# Patient Record
Sex: MALE | Race: WHITE
[De-identification: names, ages, dates, MRNs, and addresses within clinical notes are randomized per-mention and may not be internally consistent; named-entity substitution may affect disease eponyms.]

---

## 2017-05-31 ENCOUNTER — HOSPITAL ENCOUNTER (OUTPATIENT)
Dept: HOSPITAL 53 - M LAB | Age: 77
End: 2017-05-31
Attending: UROLOGY
Payer: MEDICARE

## 2017-05-31 DIAGNOSIS — R39.12: Primary | ICD-10-CM

## 2018-03-21 ENCOUNTER — HOSPITAL ENCOUNTER (OUTPATIENT)
Dept: HOSPITAL 53 - M SMT | Age: 78
End: 2018-03-21
Attending: NURSE PRACTITIONER
Payer: MEDICARE

## 2018-03-21 DIAGNOSIS — I50.20: ICD-10-CM

## 2018-03-21 DIAGNOSIS — I25.10: Primary | ICD-10-CM

## 2018-03-21 LAB
ANION GAP: 8 MEQ/L (ref 8–16)
BLOOD UREA NITROGEN: 22 MG/DL (ref 7–18)
CALCIUM LEVEL: 9 MG/DL (ref 8.8–10.2)
CARBON DIOXIDE LEVEL: 27 MEQ/L (ref 21–32)
CHLORIDE LEVEL: 104 MEQ/L (ref 98–107)
CREATININE FOR GFR: 1.47 MG/DL (ref 0.7–1.3)
GFR SERPL CREATININE-BSD FRML MDRD: 49.6 ML/MIN/{1.73_M2} (ref 42–?)
GLUCOSE, FASTING: 98 MG/DL (ref 70–100)
NT-PRO BNP: 738 PG/ML (ref ?–450)
POTASSIUM SERUM: 4.6 MEQ/L (ref 3.5–5.1)
SODIUM LEVEL: 139 MEQ/L (ref 136–145)

## 2018-03-21 PROCEDURE — 80048 BASIC METABOLIC PNL TOTAL CA: CPT

## 2018-04-13 ENCOUNTER — HOSPITAL ENCOUNTER (INPATIENT)
Dept: HOSPITAL 53 - M MS5PR | Age: 78
LOS: 7 days | Discharge: HOME | DRG: 871 | End: 2018-04-20
Attending: INTERNAL MEDICINE | Admitting: HOSPITALIST
Payer: MEDICARE

## 2018-04-13 DIAGNOSIS — Z79.82: ICD-10-CM

## 2018-04-13 DIAGNOSIS — J96.01: ICD-10-CM

## 2018-04-13 DIAGNOSIS — N18.3: ICD-10-CM

## 2018-04-13 DIAGNOSIS — Z79.01: ICD-10-CM

## 2018-04-13 DIAGNOSIS — D64.9: ICD-10-CM

## 2018-04-13 DIAGNOSIS — I50.31: ICD-10-CM

## 2018-04-13 DIAGNOSIS — K64.8: ICD-10-CM

## 2018-04-13 DIAGNOSIS — D69.6: ICD-10-CM

## 2018-04-13 DIAGNOSIS — K57.30: ICD-10-CM

## 2018-04-13 DIAGNOSIS — I25.10: ICD-10-CM

## 2018-04-13 DIAGNOSIS — E87.2: ICD-10-CM

## 2018-04-13 DIAGNOSIS — E78.5: ICD-10-CM

## 2018-04-13 DIAGNOSIS — Z88.0: ICD-10-CM

## 2018-04-13 DIAGNOSIS — I48.91: ICD-10-CM

## 2018-04-13 DIAGNOSIS — I13.0: ICD-10-CM

## 2018-04-13 DIAGNOSIS — A41.9: Primary | ICD-10-CM

## 2018-04-13 DIAGNOSIS — E11.9: ICD-10-CM

## 2018-04-13 DIAGNOSIS — Z88.2: ICD-10-CM

## 2018-04-13 DIAGNOSIS — Z95.0: ICD-10-CM

## 2018-04-13 DIAGNOSIS — J18.9: ICD-10-CM

## 2018-04-13 DIAGNOSIS — Z79.899: ICD-10-CM

## 2018-04-13 LAB
ABG BASE EXCESS: -2.5 (ref -2–2)
ABG HCO3: 19.2 MEQ/L (ref 22–26)
ABG O2 SATURATION: 94.7 % (ref 95–99)
ABG PARTIAL PRESSURE CO2: 24.6 MMHG (ref 35–45)
ABG PARTIAL PRESSURE O2: 74.8 MMHG (ref 75–100)
ABG PH (ARTERIAL): 7.51 UNITS (ref 7.35–7.45)
ABG STANDARD HCO3: 22.4 MEQ/L (ref 22–26)
ABG TOTAL CO2: 20 MEQ/L (ref 23–31)
ALBUMIN/GLOBULIN RATIO: 0.65 (ref 1–1.93)
ALBUMIN/GLOBULIN RATIO: 0.79 (ref 1–1.93)
ALBUMIN: 2.6 GM/DL (ref 3.2–5.2)
ALBUMIN: 3 GM/DL (ref 3.2–5.2)
ALKALINE PHOSPHATASE: 46 U/L (ref 45–117)
ALKALINE PHOSPHATASE: 56 U/L (ref 45–117)
ALT SERPL W P-5'-P-CCNC: 10 U/L (ref 12–78)
ALT SERPL W P-5'-P-CCNC: 12 U/L (ref 12–78)
AMYLASE SERPL-CCNC: 43 U/L (ref 25–115)
ANION GAP: 9 MEQ/L (ref 8–16)
ANION GAP: 9 MEQ/L (ref 8–16)
AST SERPL-CCNC: 11 U/L (ref 7–37)
AST SERPL-CCNC: 13 U/L (ref 7–37)
BASO #: 0 10^3/UL (ref 0–0.2)
BASO %: 0.3 % (ref 0–1)
BILIRUB CONJ SERPL-MCNC: 0.4 MG/DL (ref 0–0.2)
BILIRUB CONJ SERPL-MCNC: 0.5 MG/DL (ref 0–0.2)
BILIRUBIN,TOTAL: 0.8 MG/DL (ref 0.2–1)
BILIRUBIN,TOTAL: 1.1 MG/DL (ref 0.2–1)
BLOOD UREA NITROGEN: 30 MG/DL (ref 7–18)
BLOOD UREA NITROGEN: 31 MG/DL (ref 7–18)
CALCIUM LEVEL: 7.7 MG/DL (ref 8.8–10.2)
CALCIUM LEVEL: 7.9 MG/DL (ref 8.8–10.2)
CARBON DIOXIDE LEVEL: 21 MEQ/L (ref 21–32)
CARBON DIOXIDE LEVEL: 22 MEQ/L (ref 21–32)
CHLORIDE LEVEL: 105 MEQ/L (ref 98–107)
CHLORIDE LEVEL: 107 MEQ/L (ref 98–107)
CK MB CFR.DF SERPL CALC: 1.28
CK SERPL-CCNC: 78 U/L (ref 39–308)
CK-MB VALUE MASS: < 1 NG/ML (ref ?–3.6)
CREATININE FOR GFR: 1.7 MG/DL (ref 0.7–1.3)
CREATININE FOR GFR: 1.94 MG/DL (ref 0.7–1.3)
CRP SERPL-MCNC: 16.3 MG/DL (ref 0–0.3)
EOS #: 0 10^3/UL (ref 0–0.5)
EOSINOPHIL NFR BLD AUTO: 0 % (ref 0–3)
FERRITIN: 54 NG/ML (ref 26–388)
GFR SERPL CREATININE-BSD FRML MDRD: 36 ML/MIN/{1.73_M2} (ref 42–?)
GFR SERPL CREATININE-BSD FRML MDRD: 41.9 ML/MIN/{1.73_M2} (ref 42–?)
GLUCOSE BLDC GLUCOMTR-MCNC: 254 MG/DL (ref 83–110)
GLUCOSE BLDC GLUCOMTR-MCNC: 269 MG/DL (ref 83–110)
GLUCOSE BLDC GLUCOMTR-MCNC: 291 MG/DL (ref 83–110)
GLUCOSE, FASTING: 167 MG/DL (ref 70–100)
GLUCOSE, FASTING: 197 MG/DL (ref 70–100)
HEMATOCRIT: 30.5 % (ref 42–52)
HEMATOCRIT: 32.3 % (ref 42–52)
HEMOGLOBIN: 10.4 G/DL (ref 13.5–17.5)
HEMOGLOBIN: 9.5 G/DL (ref 13.5–17.5)
IMMATURE GRANULOCYTE %: 0.6 % (ref 0–3)
INR: 1.98
INR: 2.11
IRON (FE): 26 UG/DL (ref 65–175)
IRON SATN MFR SERPL: 7.8 % (ref 19.7–50)
LACTIC ACID SEPSIS PROTOCOL: 2.2 MMOL/L (ref 0.4–2)
LYMPH #: 0.8 10^3/UL (ref 1.5–4.5)
LYMPH %: 5.8 % (ref 24–44)
MEAN CORPUSCULAR HEMOGLOBIN: 24.9 PG (ref 27–33)
MEAN CORPUSCULAR HEMOGLOBIN: 25.8 PG (ref 27–33)
MEAN CORPUSCULAR HGB CONC: 31.1 G/DL (ref 32–36.5)
MEAN CORPUSCULAR HGB CONC: 32.2 G/DL (ref 32–36.5)
MEAN CORPUSCULAR VOLUME: 80.1 FL (ref 80–96)
MEAN CORPUSCULAR VOLUME: 80.1 FL (ref 80–96)
MONO #: 1.1 10^3/UL (ref 0–0.8)
MONO %: 7.7 % (ref 0–5)
NEUTROPHILS #: 12.3 10^3/UL (ref 1.8–7.7)
NEUTROPHILS %: 85.6 % (ref 36–66)
NRBC BLD AUTO-RTO: 0 % (ref 0–0)
NRBC BLD AUTO-RTO: 0 % (ref 0–0)
PARTIAL THROMBOPLASTIN TIME: 56.9 SECONDS (ref 26.8–37.9)
PARTIAL THROMBOPLASTIN TIME: 58.7 SECONDS (ref 26.8–37.9)
PLATELET COUNT, AUTOMATED: 124 10^3/UL (ref 150–450)
PLATELET COUNT, AUTOMATED: 135 10^3/UL (ref 150–450)
POTASSIUM SERUM: 4.4 MEQ/L (ref 3.5–5.1)
POTASSIUM SERUM: 4.4 MEQ/L (ref 3.5–5.1)
PROTHROMBIN TIME: 23.2 SECONDS (ref 12.4–14.5)
PROTHROMBIN TIME: 24.4 SECONDS (ref 12.4–14.5)
RED BLOOD COUNT: 3.81 10^6/UL (ref 4.3–6.1)
RED BLOOD COUNT: 4.03 10^6/UL (ref 4.3–6.1)
RED CELL DISTRIBUTION WIDTH: 15.5 % (ref 11.5–14.5)
RED CELL DISTRIBUTION WIDTH: 15.5 % (ref 11.5–14.5)
RETIC HEMOGLOBIN EQUIVALENT: 27.5 PG (ref 24–36)
RETICS/RBC NFR: 1.1 % (ref 0.5–1.5)
RETICULOCYTE #: 45.5 10^9/L (ref 17–77)
SODIUM LEVEL: 136 MEQ/L (ref 136–145)
SODIUM LEVEL: 137 MEQ/L (ref 136–145)
TOTAL IRON BINDING CAPACITY: 332 UG/DL (ref 250–450)
TOTAL PROTEIN: 6.6 GM/DL (ref 6.4–8.2)
TOTAL PROTEIN: 6.8 GM/DL (ref 6.4–8.2)
TROPONIN I: 0.06 NG/ML (ref ?–0.1)
WHITE BLOOD COUNT: 12.5 10^3/UL (ref 4–10)
WHITE BLOOD COUNT: 14.4 10^3/UL (ref 4–10)

## 2018-04-13 RX ADMIN — SODIUM CHLORIDE 1 MLS/HR: 9 INJECTION, SOLUTION INTRAVENOUS at 16:09

## 2018-04-13 RX ADMIN — FERROUS SULFATE TAB 325 MG (65 MG ELEMENTAL FE) 1 MG: 325 (65 FE) TAB at 20:26

## 2018-04-13 RX ADMIN — INSULIN LISPRO 2 UNITS: 100 INJECTION, SOLUTION INTRAVENOUS; SUBCUTANEOUS at 20:26

## 2018-04-13 RX ADMIN — CEFTRIAXONE 1 MLS/HR: 2 INJECTION, POWDER, FOR SOLUTION INTRAMUSCULAR; INTRAVENOUS at 02:25

## 2018-04-13 RX ADMIN — WARFARIN SODIUM 1 MG: 2.5 TABLET ORAL at 16:09

## 2018-04-13 RX ADMIN — LEVOTHYROXINE SODIUM 1 MCG: 137 TABLET ORAL at 08:26

## 2018-04-13 RX ADMIN — SODIUM CHLORIDE 1 ML: 9 INJECTION, SOLUTION INTRAVENOUS at 06:00

## 2018-04-13 RX ADMIN — INSULIN LISPRO 4 UNITS: 100 INJECTION, SOLUTION INTRAVENOUS; SUBCUTANEOUS at 08:27

## 2018-04-13 RX ADMIN — SODIUM CHLORIDE 1 UNITS: 4.5 INJECTION, SOLUTION INTRAVENOUS at 04:26

## 2018-04-13 RX ADMIN — METHYLPREDNISOLONE SODIUM SUCCINATE 1 MG: 125 INJECTION, POWDER, FOR SOLUTION INTRAMUSCULAR; INTRAVENOUS at 03:54

## 2018-04-13 RX ADMIN — FUROSEMIDE 1 MG: 20 TABLET ORAL at 08:27

## 2018-04-13 RX ADMIN — SODIUM CHLORIDE 1 MLS/HR: 9 INJECTION, SOLUTION INTRAVENOUS at 08:28

## 2018-04-13 RX ADMIN — DOCUSATE SODIUM 1 MG: 100 CAPSULE, LIQUID FILLED ORAL at 08:26

## 2018-04-13 RX ADMIN — GABAPENTIN 1 MG: 300 CAPSULE ORAL at 20:26

## 2018-04-13 RX ADMIN — SODIUM CHLORIDE 1 MLS/HR: 9 INJECTION, SOLUTION INTRAVENOUS at 04:24

## 2018-04-13 RX ADMIN — DEXTROSE MONOHYDRATE 1 MLS/HR: 50 INJECTION, SOLUTION INTRAVENOUS at 04:25

## 2018-04-13 RX ADMIN — SODIUM CHLORIDE 1 MLS/HR: 9 INJECTION, SOLUTION INTRAVENOUS at 02:45

## 2018-04-13 RX ADMIN — INSULIN LISPRO 8 UNITS: 100 INJECTION, SOLUTION INTRAVENOUS; SUBCUTANEOUS at 12:27

## 2018-04-13 RX ADMIN — IPRATROPIUM BROMIDE AND ALBUTEROL SULFATE 1 ML: .5; 3 SOLUTION RESPIRATORY (INHALATION) at 17:02

## 2018-04-13 RX ADMIN — SODIUM CHLORIDE 1 ML: 9 INJECTION, SOLUTION INTRAVENOUS at 04:15

## 2018-04-13 RX ADMIN — INSULIN LISPRO 8 UNITS: 100 INJECTION, SOLUTION INTRAVENOUS; SUBCUTANEOUS at 17:21

## 2018-04-13 RX ADMIN — ASPIRIN 1 MG: 81 TABLET ORAL at 15:41

## 2018-04-13 RX ADMIN — ACETAMINOPHEN 1 MG: 325 TABLET ORAL at 02:15

## 2018-04-13 RX ADMIN — SODIUM CHLORIDE 1 MLS/HR: 9 INJECTION, SOLUTION INTRAVENOUS at 03:12

## 2018-04-13 RX ADMIN — GABAPENTIN 1 MG: 300 CAPSULE ORAL at 08:27

## 2018-04-13 RX ADMIN — IPRATROPIUM BROMIDE AND ALBUTEROL SULFATE 1 ML: .5; 3 SOLUTION RESPIRATORY (INHALATION) at 09:03

## 2018-04-13 RX ADMIN — SODIUM CHLORIDE 1 MLS/HR: 9 INJECTION, SOLUTION INTRAVENOUS at 22:51

## 2018-04-13 RX ADMIN — DOCUSATE SODIUM 1 MG: 100 CAPSULE, LIQUID FILLED ORAL at 20:26

## 2018-04-13 RX ADMIN — DIGOXIN 1 MG: 125 TABLET ORAL at 08:27

## 2018-04-13 RX ADMIN — IPRATROPIUM BROMIDE AND ALBUTEROL SULFATE 1 ML: .5; 3 SOLUTION RESPIRATORY (INHALATION) at 23:32

## 2018-04-14 LAB
ANION GAP: 9 MEQ/L (ref 8–16)
BLOOD UREA NITROGEN: 37 MG/DL (ref 7–18)
CALCIUM LEVEL: 7.7 MG/DL (ref 8.8–10.2)
CARBON DIOXIDE LEVEL: 20 MEQ/L (ref 21–32)
CHLORIDE LEVEL: 112 MEQ/L (ref 98–107)
CREATININE FOR GFR: 1.4 MG/DL (ref 0.7–1.3)
GFR SERPL CREATININE-BSD FRML MDRD: 52.5 ML/MIN/{1.73_M2} (ref 42–?)
GLUCOSE BLDC GLUCOMTR-MCNC: 227 MG/DL (ref 83–110)
GLUCOSE BLDC GLUCOMTR-MCNC: 301 MG/DL (ref 83–110)
GLUCOSE BLDC GLUCOMTR-MCNC: 350 MG/DL (ref 83–110)
GLUCOSE, FASTING: 200 MG/DL (ref 70–100)
HEMATOCRIT: 31.8 % (ref 42–52)
HEMOGLOBIN: 10 G/DL (ref 13.5–17.5)
INR: 2.43
MEAN CORPUSCULAR HEMOGLOBIN: 25.4 PG (ref 27–33)
MEAN CORPUSCULAR HGB CONC: 31.4 G/DL (ref 32–36.5)
MEAN CORPUSCULAR VOLUME: 80.7 FL (ref 80–96)
NRBC BLD AUTO-RTO: 0 % (ref 0–0)
PLATELET COUNT, AUTOMATED: 128 10^3/UL (ref 150–450)
POTASSIUM SERUM: 4.4 MEQ/L (ref 3.5–5.1)
PROTHROMBIN TIME: 27.4 SECONDS (ref 12.4–14.5)
RED BLOOD COUNT: 3.94 10^6/UL (ref 4.3–6.1)
RED CELL DISTRIBUTION WIDTH: 15.2 % (ref 11.5–14.5)
SODIUM LEVEL: 141 MEQ/L (ref 136–145)
TRANSFERRIN SERPL-MCNC: 222 MG/DL (ref 200–370)
WHITE BLOOD COUNT: 10.7 10^3/UL (ref 4–10)

## 2018-04-14 RX ADMIN — INSULIN LISPRO 1 UNITS: 100 INJECTION, SOLUTION INTRAVENOUS; SUBCUTANEOUS at 21:00

## 2018-04-14 RX ADMIN — WARFARIN SODIUM 1 MG: 2.5 TABLET ORAL at 17:38

## 2018-04-14 RX ADMIN — SODIUM CHLORIDE 1 MLS/HR: 9 INJECTION, SOLUTION INTRAVENOUS at 05:04

## 2018-04-14 RX ADMIN — GABAPENTIN 1 MG: 300 CAPSULE ORAL at 21:58

## 2018-04-14 RX ADMIN — DOCUSATE SODIUM 1 MG: 100 CAPSULE, LIQUID FILLED ORAL at 08:44

## 2018-04-14 RX ADMIN — GABAPENTIN 1 MG: 300 CAPSULE ORAL at 08:43

## 2018-04-14 RX ADMIN — DEXTROSE MONOHYDRATE 1 MLS/HR: 50 INJECTION, SOLUTION INTRAVENOUS at 05:04

## 2018-04-14 RX ADMIN — IPRATROPIUM BROMIDE AND ALBUTEROL SULFATE 1 ML: .5; 3 SOLUTION RESPIRATORY (INHALATION) at 07:32

## 2018-04-14 RX ADMIN — ASPIRIN 1 MG: 81 TABLET ORAL at 08:44

## 2018-04-14 RX ADMIN — FERROUS SULFATE TAB 325 MG (65 MG ELEMENTAL FE) 1 MG: 325 (65 FE) TAB at 08:44

## 2018-04-14 RX ADMIN — INSULIN LISPRO 4 UNITS: 100 INJECTION, SOLUTION INTRAVENOUS; SUBCUTANEOUS at 07:41

## 2018-04-14 RX ADMIN — CEFTRIAXONE 1 MLS/HR: 1 INJECTION, POWDER, FOR SOLUTION INTRAMUSCULAR; INTRAVENOUS at 08:45

## 2018-04-14 RX ADMIN — DOCUSATE SODIUM 1 MG: 100 CAPSULE, LIQUID FILLED ORAL at 21:58

## 2018-04-14 RX ADMIN — FERROUS SULFATE TAB 325 MG (65 MG ELEMENTAL FE) 1 MG: 325 (65 FE) TAB at 21:59

## 2018-04-14 RX ADMIN — LEVOTHYROXINE SODIUM 1 MCG: 137 TABLET ORAL at 05:04

## 2018-04-14 RX ADMIN — IPRATROPIUM BROMIDE AND ALBUTEROL SULFATE 1 ML: .5; 3 SOLUTION RESPIRATORY (INHALATION) at 15:25

## 2018-04-14 RX ADMIN — INSULIN LISPRO 10 UNITS: 100 INJECTION, SOLUTION INTRAVENOUS; SUBCUTANEOUS at 12:50

## 2018-04-14 RX ADMIN — INSULIN LISPRO 10 UNITS: 100 INJECTION, SOLUTION INTRAVENOUS; SUBCUTANEOUS at 17:38

## 2018-04-14 RX ADMIN — IPRATROPIUM BROMIDE AND ALBUTEROL SULFATE 1 ML: .5; 3 SOLUTION RESPIRATORY (INHALATION) at 20:20

## 2018-04-14 RX ADMIN — DIGOXIN 1 MG: 125 TABLET ORAL at 08:44

## 2018-04-15 LAB
ANION GAP: 8 MEQ/L (ref 8–16)
BLOOD UREA NITROGEN: 34 MG/DL (ref 7–18)
CALCIUM LEVEL: 8.4 MG/DL (ref 8.8–10.2)
CARBON DIOXIDE LEVEL: 21 MEQ/L (ref 21–32)
CHLORIDE LEVEL: 109 MEQ/L (ref 98–107)
CREATININE FOR GFR: 1.25 MG/DL (ref 0.7–1.3)
GFR SERPL CREATININE-BSD FRML MDRD: 59.8 ML/MIN/{1.73_M2} (ref 42–?)
GLUCOSE BLDC GLUCOMTR-MCNC: 173 MG/DL (ref 83–110)
GLUCOSE BLDC GLUCOMTR-MCNC: 176 MG/DL (ref 83–110)
GLUCOSE BLDC GLUCOMTR-MCNC: 198 MG/DL (ref 83–110)
GLUCOSE, FASTING: 201 MG/DL (ref 70–100)
HEMATOCRIT: 33.9 % (ref 42–52)
HEMOGLOBIN: 10.6 G/DL (ref 13.5–17.5)
INR: 3.06
MEAN CORPUSCULAR HEMOGLOBIN: 25.3 PG (ref 27–33)
MEAN CORPUSCULAR HGB CONC: 31.3 G/DL (ref 32–36.5)
MEAN CORPUSCULAR VOLUME: 80.9 FL (ref 80–96)
NRBC BLD AUTO-RTO: 0 % (ref 0–0)
PLATELET COUNT, AUTOMATED: 162 10^3/UL (ref 150–450)
POTASSIUM SERUM: 4.8 MEQ/L (ref 3.5–5.1)
PROTHROMBIN TIME: 33 SECONDS (ref 12.4–14.5)
RED BLOOD COUNT: 4.19 10^6/UL (ref 4.3–6.1)
RED CELL DISTRIBUTION WIDTH: 15.5 % (ref 11.5–14.5)
SODIUM LEVEL: 138 MEQ/L (ref 136–145)
WHITE BLOOD COUNT: 9.8 10^3/UL (ref 4–10)

## 2018-04-15 RX ADMIN — IPRATROPIUM BROMIDE AND ALBUTEROL SULFATE 1 ML: .5; 3 SOLUTION RESPIRATORY (INHALATION) at 07:37

## 2018-04-15 RX ADMIN — DEXTROSE MONOHYDRATE 1 MLS/HR: 50 INJECTION, SOLUTION INTRAVENOUS at 04:07

## 2018-04-15 RX ADMIN — DOCUSATE SODIUM 1 MG: 100 CAPSULE, LIQUID FILLED ORAL at 20:02

## 2018-04-15 RX ADMIN — DOCUSATE SODIUM 1 MG: 100 CAPSULE, LIQUID FILLED ORAL at 08:19

## 2018-04-15 RX ADMIN — GABAPENTIN 1 MG: 300 CAPSULE ORAL at 08:19

## 2018-04-15 RX ADMIN — CEFTRIAXONE 1 MLS/HR: 1 INJECTION, POWDER, FOR SOLUTION INTRAMUSCULAR; INTRAVENOUS at 09:31

## 2018-04-15 RX ADMIN — IPRATROPIUM BROMIDE AND ALBUTEROL SULFATE 1 ML: .5; 3 SOLUTION RESPIRATORY (INHALATION) at 00:00

## 2018-04-15 RX ADMIN — FERROUS SULFATE TAB 325 MG (65 MG ELEMENTAL FE) 1 MG: 325 (65 FE) TAB at 08:19

## 2018-04-15 RX ADMIN — INSULIN LISPRO 4 UNITS: 100 INJECTION, SOLUTION INTRAVENOUS; SUBCUTANEOUS at 12:32

## 2018-04-15 RX ADMIN — IPRATROPIUM BROMIDE AND ALBUTEROL SULFATE 1 ML: .5; 3 SOLUTION RESPIRATORY (INHALATION) at 15:59

## 2018-04-15 RX ADMIN — INSULIN LISPRO 6 UNITS: 100 INJECTION, SOLUTION INTRAVENOUS; SUBCUTANEOUS at 08:18

## 2018-04-15 RX ADMIN — INSULIN LISPRO 1 UNITS: 100 INJECTION, SOLUTION INTRAVENOUS; SUBCUTANEOUS at 20:03

## 2018-04-15 RX ADMIN — GABAPENTIN 1 MG: 300 CAPSULE ORAL at 20:02

## 2018-04-15 RX ADMIN — LEVOTHYROXINE SODIUM 1 MCG: 137 TABLET ORAL at 05:17

## 2018-04-15 RX ADMIN — FERROUS SULFATE TAB 325 MG (65 MG ELEMENTAL FE) 1 MG: 325 (65 FE) TAB at 20:02

## 2018-04-15 RX ADMIN — INSULIN LISPRO 4 UNITS: 100 INJECTION, SOLUTION INTRAVENOUS; SUBCUTANEOUS at 17:37

## 2018-04-15 RX ADMIN — ASPIRIN 1 MG: 81 TABLET ORAL at 08:19

## 2018-04-15 RX ADMIN — DIGOXIN 1 MG: 125 TABLET ORAL at 08:22

## 2018-04-16 LAB
ANION GAP: 9 MEQ/L (ref 8–16)
BLOOD UREA NITROGEN: 31 MG/DL (ref 7–18)
CALCIUM LEVEL: 8.6 MG/DL (ref 8.8–10.2)
CARBON DIOXIDE LEVEL: 20 MEQ/L (ref 21–32)
CHLORIDE LEVEL: 110 MEQ/L (ref 98–107)
CREATININE FOR GFR: 1.28 MG/DL (ref 0.7–1.3)
CRP SERPL-MCNC: 6.34 MG/DL (ref 0–0.3)
GFR SERPL CREATININE-BSD FRML MDRD: 58.2 ML/MIN/{1.73_M2} (ref 42–?)
GLUCOSE BLDC GLUCOMTR-MCNC: 157 MG/DL (ref 83–110)
GLUCOSE BLDC GLUCOMTR-MCNC: 171 MG/DL (ref 83–110)
GLUCOSE BLDC GLUCOMTR-MCNC: 172 MG/DL (ref 83–110)
GLUCOSE, FASTING: 167 MG/DL (ref 70–100)
HEMATOCRIT: 36.1 % (ref 42–52)
HEMOGLOBIN: 11 G/DL (ref 13.5–17.5)
INR: 2.93
MEAN CORPUSCULAR HEMOGLOBIN: 24.6 PG (ref 27–33)
MEAN CORPUSCULAR HGB CONC: 30.5 G/DL (ref 32–36.5)
MEAN CORPUSCULAR VOLUME: 80.8 FL (ref 80–96)
NRBC BLD AUTO-RTO: 0.8 % (ref 0–0)
PLATELET COUNT, AUTOMATED: 174 10^3/UL (ref 150–450)
POTASSIUM SERUM: 4.5 MEQ/L (ref 3.5–5.1)
PROTHROMBIN TIME: 31.9 SECONDS (ref 12.4–14.5)
RED BLOOD COUNT: 4.47 10^6/UL (ref 4.3–6.1)
RED CELL DISTRIBUTION WIDTH: 15.9 % (ref 11.5–14.5)
SODIUM LEVEL: 139 MEQ/L (ref 136–145)
WHITE BLOOD COUNT: 9.9 10^3/UL (ref 4–10)

## 2018-04-16 RX ADMIN — AZITHROMYCIN MONOHYDRATE 1 MG: 250 TABLET ORAL at 09:34

## 2018-04-16 RX ADMIN — IPRATROPIUM BROMIDE AND ALBUTEROL SULFATE 1 ML: .5; 3 SOLUTION RESPIRATORY (INHALATION) at 07:17

## 2018-04-16 RX ADMIN — INSULIN LISPRO 4 UNITS: 100 INJECTION, SOLUTION INTRAVENOUS; SUBCUTANEOUS at 09:34

## 2018-04-16 RX ADMIN — DOCUSATE SODIUM 1 MG: 100 CAPSULE, LIQUID FILLED ORAL at 09:34

## 2018-04-16 RX ADMIN — GABAPENTIN 1 MG: 300 CAPSULE ORAL at 20:38

## 2018-04-16 RX ADMIN — DIGOXIN 1 MG: 125 TABLET ORAL at 09:37

## 2018-04-16 RX ADMIN — CEFTRIAXONE 1 MLS/HR: 1 INJECTION, POWDER, FOR SOLUTION INTRAMUSCULAR; INTRAVENOUS at 09:37

## 2018-04-16 RX ADMIN — INSULIN LISPRO 4 UNITS: 100 INJECTION, SOLUTION INTRAVENOUS; SUBCUTANEOUS at 13:25

## 2018-04-16 RX ADMIN — INSULIN LISPRO 1 UNITS: 100 INJECTION, SOLUTION INTRAVENOUS; SUBCUTANEOUS at 21:00

## 2018-04-16 RX ADMIN — CEFDINIR 1 MG: 300 CAPSULE ORAL at 20:37

## 2018-04-16 RX ADMIN — ASPIRIN 1 MG: 81 TABLET ORAL at 09:34

## 2018-04-16 RX ADMIN — DOCUSATE SODIUM 1 MG: 100 CAPSULE, LIQUID FILLED ORAL at 20:37

## 2018-04-16 RX ADMIN — IPRATROPIUM BROMIDE AND ALBUTEROL SULFATE 1 ML: .5; 3 SOLUTION RESPIRATORY (INHALATION) at 15:00

## 2018-04-16 RX ADMIN — WARFARIN SODIUM 1 MG: 2 TABLET ORAL at 17:37

## 2018-04-16 RX ADMIN — LEVOTHYROXINE SODIUM 1 MCG: 137 TABLET ORAL at 05:43

## 2018-04-16 RX ADMIN — GABAPENTIN 1 MG: 300 CAPSULE ORAL at 09:34

## 2018-04-16 RX ADMIN — FERROUS SULFATE TAB 325 MG (65 MG ELEMENTAL FE) 1 MG: 325 (65 FE) TAB at 20:37

## 2018-04-16 RX ADMIN — IPRATROPIUM BROMIDE AND ALBUTEROL SULFATE 1 ML: .5; 3 SOLUTION RESPIRATORY (INHALATION) at 23:25

## 2018-04-16 RX ADMIN — INSULIN LISPRO 4 UNITS: 100 INJECTION, SOLUTION INTRAVENOUS; SUBCUTANEOUS at 17:37

## 2018-04-16 RX ADMIN — FUROSEMIDE 1 MG: 20 TABLET ORAL at 09:35

## 2018-04-16 RX ADMIN — IPRATROPIUM BROMIDE AND ALBUTEROL SULFATE 1 ML: .5; 3 SOLUTION RESPIRATORY (INHALATION) at 00:00

## 2018-04-16 RX ADMIN — FERROUS SULFATE TAB 325 MG (65 MG ELEMENTAL FE) 1 MG: 325 (65 FE) TAB at 09:34

## 2018-04-17 LAB
ANION GAP: 7 MEQ/L (ref 8–16)
BLOOD UREA NITROGEN: 23 MG/DL (ref 7–18)
CALCIUM LEVEL: 8.4 MG/DL (ref 8.8–10.2)
CARBON DIOXIDE LEVEL: 25 MEQ/L (ref 21–32)
CHLORIDE LEVEL: 106 MEQ/L (ref 98–107)
CREATININE FOR GFR: 1.21 MG/DL (ref 0.7–1.3)
CRP SERPL-MCNC: 9.11 MG/DL (ref 0–0.3)
GFR SERPL CREATININE-BSD FRML MDRD: > 60 ML/MIN/{1.73_M2} (ref 42–?)
GLUCOSE BLDC GLUCOMTR-MCNC: 153 MG/DL (ref 83–110)
GLUCOSE BLDC GLUCOMTR-MCNC: 176 MG/DL (ref 83–110)
GLUCOSE BLDC GLUCOMTR-MCNC: 180 MG/DL (ref 83–110)
GLUCOSE, FASTING: 137 MG/DL (ref 70–100)
HEMATOCRIT: 34.1 % (ref 42–52)
HEMOGLOBIN: 10.6 G/DL (ref 13.5–17.5)
INR: 2.59
MEAN CORPUSCULAR HEMOGLOBIN: 25.7 PG (ref 27–33)
MEAN CORPUSCULAR HGB CONC: 31.1 G/DL (ref 32–36.5)
MEAN CORPUSCULAR VOLUME: 82.6 FL (ref 80–96)
NRBC BLD AUTO-RTO: 0.5 % (ref 0–0)
PLATELET COUNT, AUTOMATED: 156 10^3/UL (ref 150–450)
POTASSIUM SERUM: 4.2 MEQ/L (ref 3.5–5.1)
PROTHROMBIN TIME: 28.8 SECONDS (ref 12.4–14.5)
RED BLOOD COUNT: 4.13 10^6/UL (ref 4.3–6.1)
RED CELL DISTRIBUTION WIDTH: 16 % (ref 11.5–14.5)
SODIUM LEVEL: 138 MEQ/L (ref 136–145)
WHITE BLOOD COUNT: 9.5 10^3/UL (ref 4–10)

## 2018-04-17 RX ADMIN — FUROSEMIDE 1 MG: 10 INJECTION, SOLUTION INTRAMUSCULAR; INTRAVENOUS at 11:49

## 2018-04-17 RX ADMIN — ASPIRIN 1 MG: 81 TABLET ORAL at 08:12

## 2018-04-17 RX ADMIN — DOCUSATE SODIUM 1 MG: 100 CAPSULE, LIQUID FILLED ORAL at 20:25

## 2018-04-17 RX ADMIN — GABAPENTIN 1 MG: 300 CAPSULE ORAL at 20:24

## 2018-04-17 RX ADMIN — FUROSEMIDE 1 MG: 10 INJECTION, SOLUTION INTRAMUSCULAR; INTRAVENOUS at 17:45

## 2018-04-17 RX ADMIN — INSULIN LISPRO 4 UNITS: 100 INJECTION, SOLUTION INTRAVENOUS; SUBCUTANEOUS at 11:49

## 2018-04-17 RX ADMIN — GABAPENTIN 1 MG: 300 CAPSULE ORAL at 08:12

## 2018-04-17 RX ADMIN — AZITHROMYCIN MONOHYDRATE 1 MG: 250 TABLET ORAL at 08:12

## 2018-04-17 RX ADMIN — DIGOXIN 1 MG: 125 TABLET ORAL at 08:12

## 2018-04-17 RX ADMIN — IPRATROPIUM BROMIDE AND ALBUTEROL SULFATE 1 ML: .5; 3 SOLUTION RESPIRATORY (INHALATION) at 07:29

## 2018-04-17 RX ADMIN — DOCUSATE SODIUM 1 MG: 100 CAPSULE, LIQUID FILLED ORAL at 08:12

## 2018-04-17 RX ADMIN — CEFDINIR 1 MG: 300 CAPSULE ORAL at 08:11

## 2018-04-17 RX ADMIN — INSULIN LISPRO 4 UNITS: 100 INJECTION, SOLUTION INTRAVENOUS; SUBCUTANEOUS at 17:47

## 2018-04-17 RX ADMIN — IPRATROPIUM BROMIDE AND ALBUTEROL SULFATE 1 ML: .5; 3 SOLUTION RESPIRATORY (INHALATION) at 15:26

## 2018-04-17 RX ADMIN — INSULIN LISPRO 2 UNITS: 100 INJECTION, SOLUTION INTRAVENOUS; SUBCUTANEOUS at 08:11

## 2018-04-17 RX ADMIN — INSULIN LISPRO 1 UNITS: 100 INJECTION, SOLUTION INTRAVENOUS; SUBCUTANEOUS at 21:00

## 2018-04-17 RX ADMIN — WARFARIN SODIUM 1 MG: 2 TABLET ORAL at 17:46

## 2018-04-17 RX ADMIN — LEVOTHYROXINE SODIUM 1 MCG: 137 TABLET ORAL at 05:34

## 2018-04-17 RX ADMIN — FUROSEMIDE 1 MG: 20 TABLET ORAL at 08:12

## 2018-04-17 RX ADMIN — FERROUS SULFATE TAB 325 MG (65 MG ELEMENTAL FE) 1 MG: 325 (65 FE) TAB at 20:24

## 2018-04-17 RX ADMIN — CEFDINIR 1 MG: 300 CAPSULE ORAL at 20:24

## 2018-04-17 RX ADMIN — FERROUS SULFATE TAB 325 MG (65 MG ELEMENTAL FE) 1 MG: 325 (65 FE) TAB at 08:12

## 2018-04-18 LAB
ANION GAP: 8 MEQ/L (ref 8–16)
BLOOD UREA NITROGEN: 25 MG/DL (ref 7–18)
CALCIUM LEVEL: 8.5 MG/DL (ref 8.8–10.2)
CARBON DIOXIDE LEVEL: 26 MEQ/L (ref 21–32)
CHLORIDE LEVEL: 106 MEQ/L (ref 98–107)
CREATININE FOR GFR: 1.13 MG/DL (ref 0.7–1.3)
CRP SERPL-MCNC: 8.02 MG/DL (ref 0–0.3)
GFR SERPL CREATININE-BSD FRML MDRD: > 60 ML/MIN/{1.73_M2} (ref 42–?)
GLUCOSE BLDC GLUCOMTR-MCNC: 148 MG/DL (ref 83–110)
GLUCOSE BLDC GLUCOMTR-MCNC: 201 MG/DL (ref 83–110)
GLUCOSE, FASTING: 131 MG/DL (ref 70–100)
HEMATOCRIT: 34.8 % (ref 42–52)
HEMOGLOBIN: 10.8 G/DL (ref 13.5–17.5)
INR: 2.38
MEAN CORPUSCULAR HEMOGLOBIN: 25.2 PG (ref 27–33)
MEAN CORPUSCULAR HGB CONC: 31 G/DL (ref 32–36.5)
MEAN CORPUSCULAR VOLUME: 81.3 FL (ref 80–96)
NRBC BLD AUTO-RTO: 0 % (ref 0–0)
PLATELET COUNT, AUTOMATED: 178 10^3/UL (ref 150–450)
POTASSIUM SERUM: 3.7 MEQ/L (ref 3.5–5.1)
PROTHROMBIN TIME: 26.9 SECONDS (ref 12.4–14.5)
RED BLOOD COUNT: 4.28 10^6/UL (ref 4.3–6.1)
RED CELL DISTRIBUTION WIDTH: 17.2 % (ref 11.5–14.5)
SODIUM LEVEL: 140 MEQ/L (ref 136–145)
WHITE BLOOD COUNT: 9.1 10^3/UL (ref 4–10)

## 2018-04-18 RX ADMIN — CEFDINIR 1 MG: 300 CAPSULE ORAL at 21:02

## 2018-04-18 RX ADMIN — GABAPENTIN 1 MG: 300 CAPSULE ORAL at 21:02

## 2018-04-18 RX ADMIN — WARFARIN SODIUM 1 MG: 2 TABLET ORAL at 17:56

## 2018-04-18 RX ADMIN — FUROSEMIDE 1 MG: 10 INJECTION, SOLUTION INTRAMUSCULAR; INTRAVENOUS at 17:57

## 2018-04-18 RX ADMIN — FERROUS SULFATE TAB 325 MG (65 MG ELEMENTAL FE) 1 MG: 325 (65 FE) TAB at 21:02

## 2018-04-18 RX ADMIN — FERROUS SULFATE TAB 325 MG (65 MG ELEMENTAL FE) 1 MG: 325 (65 FE) TAB at 08:49

## 2018-04-18 RX ADMIN — GABAPENTIN 1 MG: 300 CAPSULE ORAL at 08:49

## 2018-04-18 RX ADMIN — IPRATROPIUM BROMIDE AND ALBUTEROL SULFATE 1 ML: .5; 3 SOLUTION RESPIRATORY (INHALATION) at 08:25

## 2018-04-18 RX ADMIN — AZITHROMYCIN MONOHYDRATE 1 MG: 250 TABLET ORAL at 08:49

## 2018-04-18 RX ADMIN — INSULIN LISPRO 2 UNITS: 100 INJECTION, SOLUTION INTRAVENOUS; SUBCUTANEOUS at 08:48

## 2018-04-18 RX ADMIN — DIGOXIN 1 MG: 125 TABLET ORAL at 08:49

## 2018-04-18 RX ADMIN — INSULIN LISPRO 1 UNITS: 100 INJECTION, SOLUTION INTRAVENOUS; SUBCUTANEOUS at 21:00

## 2018-04-18 RX ADMIN — ASPIRIN 1 MG: 81 TABLET ORAL at 08:49

## 2018-04-18 RX ADMIN — IPRATROPIUM BROMIDE AND ALBUTEROL SULFATE 1 ML: .5; 3 SOLUTION RESPIRATORY (INHALATION) at 04:19

## 2018-04-18 RX ADMIN — CEFDINIR 1 MG: 300 CAPSULE ORAL at 08:49

## 2018-04-18 RX ADMIN — FUROSEMIDE 1 MG: 10 INJECTION, SOLUTION INTRAMUSCULAR; INTRAVENOUS at 08:48

## 2018-04-18 RX ADMIN — LEVOTHYROXINE SODIUM 1 MCG: 137 TABLET ORAL at 06:24

## 2018-04-18 RX ADMIN — IPRATROPIUM BROMIDE AND ALBUTEROL SULFATE 1 ML: .5; 3 SOLUTION RESPIRATORY (INHALATION) at 15:45

## 2018-04-18 RX ADMIN — DOCUSATE SODIUM 1 MG: 100 CAPSULE, LIQUID FILLED ORAL at 21:02

## 2018-04-18 RX ADMIN — INSULIN LISPRO 6 UNITS: 100 INJECTION, SOLUTION INTRAVENOUS; SUBCUTANEOUS at 17:57

## 2018-04-18 RX ADMIN — DOCUSATE SODIUM 1 MG: 100 CAPSULE, LIQUID FILLED ORAL at 08:50

## 2018-04-18 RX ADMIN — INSULIN LISPRO 2 UNITS: 100 INJECTION, SOLUTION INTRAVENOUS; SUBCUTANEOUS at 13:10

## 2018-04-19 LAB
ANION GAP: 7 MEQ/L (ref 8–16)
BLOOD UREA NITROGEN: 25 MG/DL (ref 7–18)
CALCIUM LEVEL: 8.7 MG/DL (ref 8.8–10.2)
CARBON DIOXIDE LEVEL: 29 MEQ/L (ref 21–32)
CHLORIDE LEVEL: 102 MEQ/L (ref 98–107)
CREATININE FOR GFR: 1.24 MG/DL (ref 0.7–1.3)
CRP SERPL-MCNC: 4.73 MG/DL (ref 0–0.3)
GFR SERPL CREATININE-BSD FRML MDRD: > 60 ML/MIN/{1.73_M2} (ref 42–?)
GLUCOSE BLDC GLUCOMTR-MCNC: 124 MG/DL (ref 83–110)
GLUCOSE BLDC GLUCOMTR-MCNC: 197 MG/DL (ref 83–110)
GLUCOSE, FASTING: 128 MG/DL (ref 70–100)
HEMATOCRIT: 36.1 % (ref 42–52)
HEMOGLOBIN: 11.1 G/DL (ref 13.5–17.5)
INR: 2.04
MEAN CORPUSCULAR HEMOGLOBIN: 24.9 PG (ref 27–33)
MEAN CORPUSCULAR HGB CONC: 30.7 G/DL (ref 32–36.5)
MEAN CORPUSCULAR VOLUME: 81.1 FL (ref 80–96)
NRBC BLD AUTO-RTO: 0 % (ref 0–0)
PLATELET COUNT, AUTOMATED: 182 10^3/UL (ref 150–450)
POTASSIUM SERUM: 3.8 MEQ/L (ref 3.5–5.1)
PROTHROMBIN TIME: 23.7 SECONDS (ref 12.4–14.5)
RED BLOOD COUNT: 4.45 10^6/UL (ref 4.3–6.1)
RED CELL DISTRIBUTION WIDTH: 17.1 % (ref 11.5–14.5)
SODIUM LEVEL: 138 MEQ/L (ref 136–145)
WHITE BLOOD COUNT: 8.2 10^3/UL (ref 4–10)

## 2018-04-19 RX ADMIN — ASPIRIN 1 MG: 81 TABLET ORAL at 08:32

## 2018-04-19 RX ADMIN — FERROUS SULFATE TAB 325 MG (65 MG ELEMENTAL FE) 1 MG: 325 (65 FE) TAB at 08:32

## 2018-04-19 RX ADMIN — CEFDINIR 1 MG: 300 CAPSULE ORAL at 08:32

## 2018-04-19 RX ADMIN — GABAPENTIN 1 MG: 300 CAPSULE ORAL at 08:32

## 2018-04-19 RX ADMIN — IPRATROPIUM BROMIDE AND ALBUTEROL SULFATE 1 ML: .5; 3 SOLUTION RESPIRATORY (INHALATION) at 07:48

## 2018-04-19 RX ADMIN — IPRATROPIUM BROMIDE AND ALBUTEROL SULFATE 1 ML: .5; 3 SOLUTION RESPIRATORY (INHALATION) at 00:00

## 2018-04-19 RX ADMIN — DOCUSATE SODIUM 1 MG: 100 CAPSULE, LIQUID FILLED ORAL at 21:57

## 2018-04-19 RX ADMIN — DIGOXIN 1 MG: 125 TABLET ORAL at 08:32

## 2018-04-19 RX ADMIN — INSULIN LISPRO 2 UNITS: 100 INJECTION, SOLUTION INTRAVENOUS; SUBCUTANEOUS at 08:29

## 2018-04-19 RX ADMIN — WARFARIN SODIUM 1 MG: 2 TABLET ORAL at 18:20

## 2018-04-19 RX ADMIN — AZITHROMYCIN MONOHYDRATE 1 MG: 250 TABLET ORAL at 08:32

## 2018-04-19 RX ADMIN — INSULIN LISPRO 4 UNITS: 100 INJECTION, SOLUTION INTRAVENOUS; SUBCUTANEOUS at 13:23

## 2018-04-19 RX ADMIN — CEFDINIR 1 MG: 300 CAPSULE ORAL at 21:58

## 2018-04-19 RX ADMIN — DOCUSATE SODIUM 1 MG: 100 CAPSULE, LIQUID FILLED ORAL at 08:29

## 2018-04-19 RX ADMIN — LEVOTHYROXINE SODIUM 1 MCG: 137 TABLET ORAL at 05:17

## 2018-04-19 RX ADMIN — INSULIN LISPRO 2 UNITS: 100 INJECTION, SOLUTION INTRAVENOUS; SUBCUTANEOUS at 18:20

## 2018-04-19 RX ADMIN — INSULIN LISPRO 1 UNITS: 100 INJECTION, SOLUTION INTRAVENOUS; SUBCUTANEOUS at 21:00

## 2018-04-19 RX ADMIN — IPRATROPIUM BROMIDE AND ALBUTEROL SULFATE 1 ML: .5; 3 SOLUTION RESPIRATORY (INHALATION) at 15:52

## 2018-04-19 RX ADMIN — GABAPENTIN 1 MG: 300 CAPSULE ORAL at 21:58

## 2018-04-19 RX ADMIN — FUROSEMIDE 1 MG: 10 INJECTION, SOLUTION INTRAMUSCULAR; INTRAVENOUS at 08:35

## 2018-04-19 RX ADMIN — FERROUS SULFATE TAB 325 MG (65 MG ELEMENTAL FE) 1 MG: 325 (65 FE) TAB at 21:58

## 2018-04-20 LAB
ANION GAP: 3 MEQ/L (ref 8–16)
BLOOD UREA NITROGEN: 23 MG/DL (ref 7–18)
CALCIUM LEVEL: 8.7 MG/DL (ref 8.8–10.2)
CARBON DIOXIDE LEVEL: 30 MEQ/L (ref 21–32)
CHLORIDE LEVEL: 103 MEQ/L (ref 98–107)
CREATININE FOR GFR: 1.16 MG/DL (ref 0.7–1.3)
CRP SERPL-MCNC: 2.78 MG/DL (ref 0–0.3)
GFR SERPL CREATININE-BSD FRML MDRD: > 60 ML/MIN/{1.73_M2} (ref 42–?)
GLUCOSE BLDC GLUCOMTR-MCNC: 147 MG/DL (ref 83–110)
GLUCOSE, FASTING: 125 MG/DL (ref 70–100)
HEMATOCRIT: 35.2 % (ref 42–52)
HEMOGLOBIN: 10.8 G/DL (ref 13.5–17.5)
INR: 1.86
MEAN CORPUSCULAR HEMOGLOBIN: 24.7 PG (ref 27–33)
MEAN CORPUSCULAR HGB CONC: 30.7 G/DL (ref 32–36.5)
MEAN CORPUSCULAR VOLUME: 80.5 FL (ref 80–96)
NRBC BLD AUTO-RTO: 0 % (ref 0–0)
PLATELET COUNT, AUTOMATED: 204 10^3/UL (ref 150–450)
POTASSIUM SERUM: 3.8 MEQ/L (ref 3.5–5.1)
PROTHROMBIN TIME: 22 SECONDS (ref 12.4–14.5)
RED BLOOD COUNT: 4.37 10^6/UL (ref 4.3–6.1)
RED CELL DISTRIBUTION WIDTH: 17.5 % (ref 11.5–14.5)
SODIUM LEVEL: 136 MEQ/L (ref 136–145)
WHITE BLOOD COUNT: 8 10^3/UL (ref 4–10)

## 2018-04-20 RX ADMIN — GABAPENTIN 1 MG: 300 CAPSULE ORAL at 08:08

## 2018-04-20 RX ADMIN — DIGOXIN 1 MG: 125 TABLET ORAL at 08:07

## 2018-04-20 RX ADMIN — DOCUSATE SODIUM 1 MG: 100 CAPSULE, LIQUID FILLED ORAL at 08:15

## 2018-04-20 RX ADMIN — ASPIRIN 1 MG: 81 TABLET ORAL at 08:08

## 2018-04-20 RX ADMIN — DOCUSATE SODIUM 1 MG: 100 CAPSULE, LIQUID FILLED ORAL at 08:08

## 2018-04-20 RX ADMIN — AZITHROMYCIN MONOHYDRATE 1 MG: 250 TABLET ORAL at 08:07

## 2018-04-20 RX ADMIN — LEVOTHYROXINE SODIUM 1 MCG: 137 TABLET ORAL at 05:38

## 2018-04-20 RX ADMIN — FUROSEMIDE 1 MG: 20 TABLET ORAL at 08:07

## 2018-04-20 RX ADMIN — IPRATROPIUM BROMIDE AND ALBUTEROL SULFATE 1 ML: .5; 3 SOLUTION RESPIRATORY (INHALATION) at 07:12

## 2018-04-20 RX ADMIN — IPRATROPIUM BROMIDE AND ALBUTEROL SULFATE 1 ML: .5; 3 SOLUTION RESPIRATORY (INHALATION) at 00:00

## 2018-04-20 RX ADMIN — CEFDINIR 1 MG: 300 CAPSULE ORAL at 08:07

## 2018-04-20 RX ADMIN — FERROUS SULFATE TAB 325 MG (65 MG ELEMENTAL FE) 1 MG: 325 (65 FE) TAB at 08:08

## 2018-04-20 RX ADMIN — INSULIN LISPRO 2 UNITS: 100 INJECTION, SOLUTION INTRAVENOUS; SUBCUTANEOUS at 08:07

## 2018-09-07 ENCOUNTER — HOSPITAL ENCOUNTER (OUTPATIENT)
Dept: HOSPITAL 53 - M LAB REF | Age: 78
End: 2018-09-07
Attending: INTERNAL MEDICINE
Payer: MEDICARE

## 2018-09-07 DIAGNOSIS — I48.2: ICD-10-CM

## 2018-09-07 DIAGNOSIS — Z51.81: Primary | ICD-10-CM

## 2018-09-07 DIAGNOSIS — Z79.899: ICD-10-CM

## 2018-09-07 LAB — DIGOXIN LEVEL: 1.3 NG/ML (ref 0.5–2)

## 2018-09-07 PROCEDURE — 80162 ASSAY OF DIGOXIN TOTAL: CPT

## 2019-08-02 ENCOUNTER — HOSPITAL ENCOUNTER (OUTPATIENT)
Dept: HOSPITAL 53 - M WUC | Age: 79
End: 2019-08-02
Attending: NURSE PRACTITIONER
Payer: MEDICARE

## 2019-08-02 DIAGNOSIS — M10.00: Primary | ICD-10-CM

## 2019-11-25 ENCOUNTER — HOSPITAL ENCOUNTER (EMERGENCY)
Dept: HOSPITAL 53 - M ED | Age: 79
Discharge: HOME | End: 2019-11-25
Payer: MEDICARE

## 2019-11-25 VITALS — HEIGHT: 71 IN | WEIGHT: 180.38 LBS | BODY MASS INDEX: 25.25 KG/M2

## 2019-11-25 VITALS — SYSTOLIC BLOOD PRESSURE: 153 MMHG | DIASTOLIC BLOOD PRESSURE: 72 MMHG
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
## 2019-11-25 DIAGNOSIS — Z79.899: ICD-10-CM

## 2019-11-25 DIAGNOSIS — Z88.0: ICD-10-CM
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
## 2019-11-25 DIAGNOSIS — E11.9: ICD-10-CM

## 2019-11-25 DIAGNOSIS — I10: ICD-10-CM

## 2019-11-25 DIAGNOSIS — Z88.2: ICD-10-CM

## 2019-11-25 DIAGNOSIS — Z79.82: ICD-10-CM

## 2019-11-25 DIAGNOSIS — Z88.1: ICD-10-CM

## 2019-11-25 DIAGNOSIS — Z79.01: ICD-10-CM

## 2019-11-25 DIAGNOSIS — S30.823A: Primary | ICD-10-CM

## 2019-11-25 DIAGNOSIS — Z79.84: ICD-10-CM

## 2020-04-28 ENCOUNTER — HOSPITAL ENCOUNTER (OUTPATIENT)
Dept: HOSPITAL 53 - M LAB REF | Age: 80
End: 2020-04-28
Attending: INTERNAL MEDICINE
Payer: MEDICARE

## 2020-04-28 DIAGNOSIS — E79.0: Primary | ICD-10-CM

## 2021-06-17 ENCOUNTER — HOSPITAL ENCOUNTER (INPATIENT)
Dept: HOSPITAL 53 - M ED | Age: 81
LOS: 3 days | Discharge: ANOTHER HOSPITAL | DRG: 291 | End: 2021-06-20
Attending: FAMILY MEDICINE | Admitting: INTERNAL MEDICINE
Payer: MEDICARE

## 2021-06-17 VITALS — SYSTOLIC BLOOD PRESSURE: 136 MMHG | DIASTOLIC BLOOD PRESSURE: 71 MMHG

## 2021-06-17 VITALS — BODY MASS INDEX: 24.75 KG/M2 | HEIGHT: 71 IN | WEIGHT: 176.81 LBS

## 2021-06-17 DIAGNOSIS — Z88.2: ICD-10-CM

## 2021-06-17 DIAGNOSIS — Z79.82: ICD-10-CM

## 2021-06-17 DIAGNOSIS — Z79.899: ICD-10-CM

## 2021-06-17 DIAGNOSIS — I65.22: ICD-10-CM

## 2021-06-17 DIAGNOSIS — Z87.891: ICD-10-CM

## 2021-06-17 DIAGNOSIS — N17.9: ICD-10-CM

## 2021-06-17 DIAGNOSIS — I50.23: ICD-10-CM

## 2021-06-17 DIAGNOSIS — R55: ICD-10-CM

## 2021-06-17 DIAGNOSIS — E03.9: ICD-10-CM

## 2021-06-17 DIAGNOSIS — Z95.0: ICD-10-CM

## 2021-06-17 DIAGNOSIS — K57.30: ICD-10-CM

## 2021-06-17 DIAGNOSIS — E11.9: ICD-10-CM

## 2021-06-17 DIAGNOSIS — I27.20: ICD-10-CM

## 2021-06-17 DIAGNOSIS — E87.5: ICD-10-CM

## 2021-06-17 DIAGNOSIS — N18.2: ICD-10-CM

## 2021-06-17 DIAGNOSIS — Z88.0: ICD-10-CM

## 2021-06-17 DIAGNOSIS — I13.0: Primary | ICD-10-CM

## 2021-06-17 DIAGNOSIS — M10.9: ICD-10-CM

## 2021-06-17 DIAGNOSIS — K64.8: ICD-10-CM

## 2021-06-17 DIAGNOSIS — I65.09: ICD-10-CM

## 2021-06-17 DIAGNOSIS — I48.91: ICD-10-CM

## 2021-06-17 DIAGNOSIS — I25.10: ICD-10-CM

## 2021-06-17 DIAGNOSIS — Z95.1: ICD-10-CM

## 2021-06-17 DIAGNOSIS — I25.2: ICD-10-CM

## 2021-06-17 LAB
ALBUMIN SERPL BCG-MCNC: 3.5 GM/DL (ref 3.2–5.2)
ALT SERPL W P-5'-P-CCNC: 19 U/L (ref 12–78)
APTT BLD: 40.8 SECONDS (ref 24.2–38.5)
BASOPHILS # BLD AUTO: 0.1 10^3/UL (ref 0–0.2)
BASOPHILS NFR BLD AUTO: 0.6 % (ref 0–1)
BILIRUB CONJ SERPL-MCNC: 0.3 MG/DL (ref 0–0.2)
BILIRUB SERPL-MCNC: 0.8 MG/DL (ref 0.2–1)
BUN SERPL-MCNC: 39 MG/DL (ref 7–18)
CALCIUM SERPL-MCNC: 9.5 MG/DL (ref 8.8–10.2)
CHLORIDE SERPL-SCNC: 104 MEQ/L (ref 98–107)
CK MB CFR.DF SERPL CALC: 2.59
CK MB SERPL-MCNC: 1.5 NG/ML (ref ?–3.6)
CK SERPL-CCNC: 58 U/L (ref 39–308)
CO2 SERPL-SCNC: 25 MEQ/L (ref 21–32)
CREAT SERPL-MCNC: 2.21 MG/DL (ref 0.7–1.3)
DIGOXIN SERPL-MCNC: 1.1 NG/ML (ref 0.5–2)
EOSINOPHIL # BLD AUTO: 0.3 10^3/UL (ref 0–0.5)
EOSINOPHIL NFR BLD AUTO: 3.5 % (ref 0–3)
EST. AVERAGE GLUCOSE BLD GHB EST-MCNC: 154 MG/DL (ref 60–110)
GFR SERPL CREATININE-BSD FRML MDRD: 30.7 ML/MIN/{1.73_M2} (ref 42–?)
GLUCOSE SERPL-MCNC: 212 MG/DL (ref 70–100)
HCT VFR BLD AUTO: 51.5 % (ref 42–52)
HGB BLD-MCNC: 16.7 G/DL (ref 13.5–17.5)
INR PPP: 2.43
LIPASE SERPL-CCNC: 188 U/L (ref 73–393)
LYMPHOCYTES # BLD AUTO: 0.9 10^3/UL (ref 1.5–5)
LYMPHOCYTES NFR BLD AUTO: 10.9 % (ref 24–44)
MAGNESIUM SERPL-MCNC: 2.4 MG/DL (ref 1.8–2.4)
MCH RBC QN AUTO: 31.9 PG (ref 27–33)
MCHC RBC AUTO-ENTMCNC: 32.4 G/DL (ref 32–36.5)
MCV RBC AUTO: 98.3 FL (ref 80–96)
MONOCYTES # BLD AUTO: 0.5 10^3/UL (ref 0–0.8)
MONOCYTES NFR BLD AUTO: 6.5 % (ref 2–8)
NEUTROPHILS # BLD AUTO: 6.4 10^3/UL (ref 1.5–8.5)
NEUTROPHILS NFR BLD AUTO: 77.8 % (ref 36–66)
NT-PRO BNP: 1357 PG/ML (ref ?–450)
OSMOLALITY SERPL: 302 MOSM/KG (ref 280–301)
PLATELET # BLD AUTO: 142 10^3/UL (ref 150–450)
POTASSIUM SERPL-SCNC: 5 MEQ/L (ref 3.5–5.1)
POTASSIUM SERPL-SCNC: 5.6 MEQ/L (ref 3.5–5.1)
PROT SERPL-MCNC: 7.4 GM/DL (ref 6.4–8.2)
PROTHROMBIN TIME: 27 SECONDS (ref 12.5–14.3)
RBC # BLD AUTO: 5.24 10^6/UL (ref 4.3–6.1)
RSV RNA NPH QL NAA+PROBE: NEGATIVE
SODIUM SERPL-SCNC: 137 MEQ/L (ref 136–145)
TROPONIN I SERPL-MCNC: 0.04 NG/ML (ref ?–0.1)
TROPONIN I SERPL-MCNC: 0.1 NG/ML (ref ?–0.1)
TSH SERPL DL<=0.005 MIU/L-ACNC: 1.59 UIU/ML (ref 0.36–3.74)
URATE SERPL-MCNC: 7.4 MG/DL (ref 3.5–7.2)
WBC # BLD AUTO: 8.3 10^3/UL (ref 4–10)

## 2021-06-17 RX ADMIN — INSULIN LISPRO SCH UNITS: 100 INJECTION, SOLUTION INTRAVENOUS; SUBCUTANEOUS at 21:44

## 2021-06-17 RX ADMIN — INSULIN LISPRO SCH UNITS: 100 INJECTION, SOLUTION INTRAVENOUS; SUBCUTANEOUS at 23:38

## 2021-06-17 RX ADMIN — FUROSEMIDE SCH MG: 10 INJECTION, SOLUTION INTRAMUSCULAR; INTRAVENOUS at 21:44

## 2021-06-17 RX ADMIN — FUROSEMIDE SCH MG: 10 INJECTION, SOLUTION INTRAMUSCULAR; INTRAVENOUS at 23:37

## 2021-06-17 RX ADMIN — WARFARIN SODIUM SCH MG: 2.5 TABLET ORAL at 23:38

## 2021-06-17 RX ADMIN — GABAPENTIN SCH MG: 300 CAPSULE ORAL at 23:39

## 2021-06-17 NOTE — REPVR
PROCEDURE INFORMATION: 

Exam: US Duplex Bilateral Extracranial Arteries 

Exam date and time: 6/17/2021 9:41 PM 

Age: 80 years old 

Clinical indication: Syncope and collapse; Prior surgery; Surgery date: 6+ 

months; Surgery type: S/P RT endartectomy ~10 years ago per patient; Additional 

info: Taye, syncope 



TECHNIQUE: 

Imaging protocol: Real-time Duplex ultrasound scan of the bilateral carotid and 

vertebral arteries combining gray scale, color Doppler and spectral waveform 

analysis. Bilateral exam. 



COMPARISON: 

CT Head without contrast 6/17/2021 8:43 PM 



FINDINGS: 

 Right side: 

 Patient is status post right endarterectomy. There is mild atherosclerotic 

plaque formation right carotid bifurcation. The right ICA/ CCA ratio is 1.4 

with peak velocity of the right internal carotid artery of on written 16 cm/s. 

The amount of narrowing on the right would be less than 50%. Right vertebral 

artery antegrade. 

 Left side: 

 There is very severe irregular atherosclerotic plaque formation throughout all 

of the left carotid bifurcation. This involves the CCA, ECA and ICA. Because of 

the severe calcified atherosclerotic plaque the exam is limited and I would 

recommend correlation with an MR angiogram or CT angiogram. The left ICA/ CCA 

ratio is 1.2. The peak velocity of the left internal carotid artery is only 74 

cm/s. The left external carotid artery demonstrates significant stenosis with a 

velocity of 192 cm/s. Velocity reading of the left internal carotid artery at 

74 cm/s may be inaccurate and therefore an MRA or CT angiogram should be 

considered. The left vertebral artery is antegrade. 



IMPRESSION: 

1. No significant stenosis right internal carotid artery. 

2. Very severe irregular plaque with calcification throughout the left carotid 

bifurcation. The velocity reading through the left internal carotid artery is 

diminished at 74 cm/s. Recommend MRA or CT angiography to better measure the 

amount of stenosis. 





Electronically signed by: Sameer Jo On 06/17/2021  23:21:05 PM

## 2021-06-17 NOTE — ECGEPIP
[facility] - ED

                                       

                                       Test Date:    2021

Pat Name:     CHARANJIT TALBERT          Department:   

Patient ID:   [id number]                 Room:         -

Gender:       Male                     Technician:   

:          [id number]               Requested By: KANNAN JACOB

Order Number: [id number]     Reading MD:   Jeovany Antonio

                                 Measurements

Intervals                              Axis          

Rate:         63                       P:            

MT:                                    QRS:          103

QRSD:         132                      T:            0

QT:           428                                    

QTc:          437                                    

                           Interpretive Statements

Ventricular-paced rhythm

SIMILAR TO 21

Electronically Signed on 2021 21:38:43 EDT by Jeovany Antonio

## 2021-06-17 NOTE — REPVR
PROCEDURE INFORMATION: 

Exam: CT Head Without Contrast 

Exam date and time: 6/17/2021 8:44 PM 

Age: 80 years old 

Clinical indication: Syncope and collapse 



TECHNIQUE: 

Imaging protocol: Computed tomography of the head without contrast. 

Radiation optimization: All CT scans at this facility use at least one of these 

dose optimization techniques: automated exposure control; mA and/or kV 

adjustment per patient size (includes targeted exams where dose is matched to 

clinical indication); or iterative reconstruction. 



COMPARISON: 

No relevant prior studies available. 



FINDINGS: 

Brain: There is no acute intracranial hemorrhage or mass effect. Moderate 

diffuse volume loss is within the range of normal for patient age. There are 

small vessel ischemic changes within the periventricular and subcortical white 

matter, but the normal gray-white matter delineation is maintained.   There is 

a small calcification within the left frontal lobe.  Cerebral ventricles: 

Prominence of the ventricular system is commensurate with volume loss. 

Paranasal sinuses: There is fluid opacification of the ethmoid air cells and 

maxillary sinuses. There is moderate left inferior frontal sinus mucosal 

thickening. 

Mastoid air cells: Visualized mastoid air cells are well aerated. 

Bones/joints: Unremarkable. No acute fracture. 

Soft tissues: Unremarkable. 



IMPRESSION: 

No acute intracranial hemorrhage or edema.  Sinusitis.



Electronically signed by: Lety Reyes On 06/17/2021  21:44:26 PM

## 2021-06-17 NOTE — HPEPDOC
[facility] Medical History & Physical


Date of Admission


2021


Date of Service:  2021


Primary Care Physician:  Jr Gomes Collins


Attending Physician:  FREDY LARSON MD





History and Physical


TIME OF SERVICE: 755pm


   


CHIEF COMPLAINT: loss of consciousness





HISTORY OF PRESENT ILLNESS:  is a 80 yr old M w who has been feeling 

short of breath with exertion for several weeks. Last week he begun to have 

episodes where he felt light headed. Today while moving a card table with his 

wife he felt light headed and very short of breath. Later on while driving he 

had episodes where saw white spots; at one point in time he couldnt see and 

felt very short of breath. He managed to drive home pull into the drive way and 

called his wife to help him get out of the car; while walking to the house he 

last consciousness; he didnt hurt himself because his wife helped him to the 

ground. Per Marvin Knapp his ICD/Pacer with Opti Vol Fluid monitor, was 

interrogated, didnt show any events but noted that he had fluid overload.





REVIEW OF SYSTEMS: 12-point review of systems negative except as listed in HPI





PMH/PSH: A Fib s/p AV manav ablation, CAD s/p MI / stents x 2 /CABG, Gout, DLP, 

Essential HTN, NIDDM, Chronic HFrecEF / bi-atrial enlargement w ICD/ single 

chamber pacemake with Opti Vol fluid monitor, Hypothyrodism,moderate type 2 

Pulmonary HTN, PVD s/p femoral and iliac stents, carotid artery disease s/p 

right CEA (there are plans for a left CEA in the near future), hemorrhoids, 

diverticulosis w episode of complicated diverticulitis w colevesical fistula 

requiring partial colectomy and bladder repair, Umbilical hernia repair, Bowel 

resection





SOCIAL HISTORY: He is a former smoker, doesnt drink alcohol, is , a 

retired [employer] manager





FAMILY HISTORY: Father  age 53 from [place] / Brother with MI at age 39 / 

Mother was otherwise healthy / Family hx positive for diabetes and COPD


   


ALLERGIES: Please see below.





HOME MEDICATIONS: Please see below. 





PHYSICAL EXAMINATION:


Vital Signs








  Date Time  Temp Pulse Resp B/P (MAP) Pulse Ox O2 Delivery O2 Flow Rate FiO2


 


21 14:58 96.5 61 22 124/58 (80) 99 Room Air  


 


21 15:28       3.0 





GENERAL APPEARANCE: slim build and developed / NAD


HEENT: EOMI / MMM&P


CARDIOVASCULAR: RRR/+ systolic murmur / no LE edema


LUNGS: after returning from the bathroom he was short of breath / lungs CTAB on 

RA


ABDOMEN: contour flat / soft & NT w palpation


MUSCULOSKELETAL: NCAT / YVONNE x 4 


INTEGUMENT: not flushed / no rashes / not cyanotic


NEUROLOGICAL: CN 2-12 intact / speech not dysarthric/ gait unsteady


PSYCHIATRIC: A&O x3 / able to understand and follow all commands





LABORATORY DATA:





Immature Granulocyte % (Auto) 0.7, Neutrophils (%) (Auto) 77.8H, Lymphocytes (%)

(Auto) 10.9L, Monocytes (%) (Auto) 6.5, Eosinophils (%) (Auto) 3.5H, Basophils 

(%) (Auto) 0.6, Neutrophils # (Auto) 6.4, Lymphocytes # (Auto) 0.9L, Monocytes #

(Auto) 0.5, Eosinophils # (Auto) 0.3, Basophils # (Auto) 0.1, Nucleated Red 

Blood Cells % (auto) 0.0, Anion Gap 8, Glomerular Filtration Rate 30.7L, Calcium

Level 9.5, Total Bilirubin 0.8, Direct Bilirubin 0.3H, Aspartate Amino Transf 

(AST/SGOT) 9, Alanine Aminotransferase (ALT/SGPT) 19, Alkaline Phosphatase 88, 

Total Creatine Kinase 58, Creatine Kinase MB 1.5, Creatine Kinase MB Relative 

Index 2.59, Troponin I 0.04, NT-Pro-B-Type Natriuretic Peptide 1357H, Total 

Protein 7.4, Albumin 3.5, Albumin/Globulin Ratio 0.9, Lipase 188


21 15:13: 


Prothrombin Time 27.0H, Prothromb Time International Ratio 2.43, Activated 

Partial Thromboplast Time 40.8H





IMAGING:  Chest xray IMPRESSION: No acute infiltrate.  Small round density in 

the right costophrenic angle probably represents a nipple shadow.  This could be

confirmed with additional radiographs with nipple markers.





MICROBIOLOGY: respiratory panel neg





ASSESSMENT:  is an 80yr old w A Fib, CAD, DLP, Gout, HTN, NIDDM, & 

HFrecEF w ICD/ single chamber pacemaker & type 2 pulmonary HTN; his admitting 

diagnoses include syncope, acute on chronic HFrecEF , Hyperkalemia & CLAUDIA on 

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





PLAN:


1 Syncope 


Plan: admit to medical floor / Telemetry / f/u orthostats, fall precautions, 

neuro checks / f/u CT head and carotid US





2 Acute on chronic HFrecEF 


He also has type 2 Pulmonary HTN,


Has NYHF Class 3 symptoms today


Plan: elevate head of bed to 45 degrees/ strict Is/OS, daily weights, fluid 

restriction to 2L or 67oz, salt restriction to 2G / f/u TSH, trend Troponins / 

f/u  Echo /  lV Lasix  (target fluid balance net  1.5L daily) /  f/u K and renal

function daily / c/w Coreg / hold Sacubitril/Valsartan & Empagliflozin pending 

resolution of CLAUDIA / He is not on a K sparing diuretic possibly bc of episodes of

hyperkalemia ? / c/w Digoxin & check Dig levels / prior to discharge the day 

team my consider rechecking BNP for prognostic purposes because high levels and 

levels that dont trend down are linked with increased mortality and 

rehospitalization / after discharge he should f/u w PCP or Cardiologist w/in one

week of discharge to prevent readmission 





3 Hyperkalemia


Likely due to CLAUDIA


Plan: Calcium gluconate /repeat BMP at 9





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


LIkey due to congestive nephropathy and recent addition of Empagliflozin


Plan: monitor UOP / IVF / f/u renal panel, CK, Uric acid, Ulytes for FENa or 

FEUrea / renal US / ARN inhibitor and SGLT2 inhibitor





5  Carotid artery disease s/p right CEA (there are plans for a left CEA in the 

near future), 


Plan: ASA & statin / f/u Carotid US





6 CAD s/p MI / stents x 2 /CABG


Plan: ASA, Coreg





7  A Fib s/p AV manav ablation


Plan: warfarin and digoxin / f/u INR





8 Hypothyroidism


Plan: levothyroxine /f/u TSH





9 DLP


Plan: statin


10 Essential HTN


Plan: Coreg





11 NIDDM


Plan:  diabetic diet / f/u accuchecks /  hypoglycemia protocol / sliding scale 

insulin / hold SGLT 2 inhibitor pending resolution of CLAUDIA / f/u A1C (target for 

a geriatric patient that is functionally independent is A1C is 7.1 to 8.0% ) 





12 PVD s/p femoral and iliac stents


Plan: ASA & statin





13 Gout


Plan: allopurinol / f/u Uric acid





DVT px n/a on Warfarin





Dispo: home after at least 2 midnights stay





Home Medications


Scheduled


Allopurinol (Allopurinol) 100 Mg Tablet, 100 MG PO QPM


Aspirin (Aspirin EC) 81 Mg Tabec, 81 MG PO DAILY


Carvedilol (Carvedilol) 12.5 Mg Tab, 12.5 MG PO BID


Digoxin (Digoxin) 125 Mcg Tab, 125 MCG PO DAILY


Empagliflozin (Jardiance) 10 Mg Tablet, 10 MG PO DAILY


Furosemide (Furosemide) 20 Mg Tab, 20 MG PO DAILY


Gabapentin (Gabapentin) 300 Mg Cap, 600 MG PO QHS


Lactobacillus Acidophilus (Probiotic) 1 Each Capsule, 1 CAP PO DAILY


Levothyroxine Sodium (Levothyroxine Sodium) 112 Mcg Tablet, 112 MCG PO QAM


Polyvinyl Alcohol/Povidone/Pf (Refresh Classic Eye Drops) 1 Ea Sol, 1 DROP OU 

BID


Sacubitril/Valsartan (Entresto 24 mg-26 mg Tablet) 1 Tab Tab, 1 TAB PO BID


Warfarin Sodium (Jantoven) 5 Mg Tab, 2.5 MG PO QPM





Allergies


Coded Allergies:  


     Penicillins (Verified  Allergy, Unknown, 19)


   HIVES


     Sulfa (Sulfonamide Antibiotics) (Verified  Allergy, Unknown, 19)


   HIVES


     Statins-Hmg-Coa Reductase Inhibitor (Verified  Adverse Reaction, Unknown, 

MUSCLE WEAKNESS, 21)





A-FIB/CHADSVASC


A-FIB History


Current/History of A-Fib/PAF?:  No


Current PO Anticoag Therapy:  No











FREDY LARSON MD                2021 19:45

## 2021-06-17 NOTE — REPVR
PROCEDURE INFORMATION: 

Exam: US Retroperitoneal Limited, Kidneys 

Exam date and time: 6/17/2021 9:41 PM 

Age: 80 years old 

Clinical indication: Abnormal findings; Abnormal lab test; Abnormal kidney 

function lab tests; Additional info: Taye 



TECHNIQUE: 

Imaging protocol: Real-time ultrasound of the retroperitoneum with image 

documentation. Examination was focused on the kidneys. 



COMPARISON: 

CT ABD PELVIS W/O FOL BY WIT 9/8/2015 5:07 PM 



FINDINGS: 

Right kidney: The right kidney measures 9.7 cm in length by 4.5 cm in thickness 

and there is no hydronephrosis. There is lobulation of the borders of the right 

kidney with increased echogenicity. This can be seen with renal failure. 

Left kidney: The left kidney measures 11 cm in length by 5.7 cm in thickness 

and there is no hydronephrosis. 

Bladder: Normal appearing urinary bladder.  Prominent prostate measuring 6 cm 

by 4.4 cm. 



IMPRESSION: 

No evidence of hydronephrosis.  Please see the above comments.



Electronically signed by: Sameer Jo On 06/17/2021  23:25:51 PM

## 2021-06-17 NOTE — REP
INDICATION:

CHEST PAIN.



COMPARISON:

08/10/2020.



TECHNIQUE:

Single portable AP view of the chest was performed.



FINDINGS:

There is no acute infiltrate or pulmonary edema.  The heart is upper limits of normal

in size.  There is calcification of the thoracic aorta.  The mediastinal silhouette is

unchanged.  Multiple sternal wires and mediastinal clips, as well as left pacemaker

are again noted.  A round density in the right costophrenic angle most likely

represents a nipple shadow.



IMPRESSION:

No acute infiltrate.  Small round density in the right costophrenic angle probably

represents a nipple shadow.  This could be confirmed with additional radiographs with

nipple markers.





<Electronically signed by Javed French > 06/17/21 [id number]

## 2021-06-18 VITALS — SYSTOLIC BLOOD PRESSURE: 108 MMHG | DIASTOLIC BLOOD PRESSURE: 65 MMHG

## 2021-06-18 VITALS — DIASTOLIC BLOOD PRESSURE: 51 MMHG | SYSTOLIC BLOOD PRESSURE: 108 MMHG | OXYGEN SATURATION: 91 %

## 2021-06-18 VITALS — DIASTOLIC BLOOD PRESSURE: 58 MMHG | SYSTOLIC BLOOD PRESSURE: 108 MMHG

## 2021-06-18 VITALS — OXYGEN SATURATION: 86 %

## 2021-06-18 VITALS — SYSTOLIC BLOOD PRESSURE: 108 MMHG | DIASTOLIC BLOOD PRESSURE: 58 MMHG

## 2021-06-18 VITALS — DIASTOLIC BLOOD PRESSURE: 67 MMHG | SYSTOLIC BLOOD PRESSURE: 133 MMHG

## 2021-06-18 VITALS — OXYGEN SATURATION: 91 %

## 2021-06-18 VITALS — SYSTOLIC BLOOD PRESSURE: 120 MMHG | DIASTOLIC BLOOD PRESSURE: 56 MMHG

## 2021-06-18 LAB
ALBUMIN SERPL BCG-MCNC: 3.7 GM/DL (ref 3.2–5.2)
ALT SERPL W P-5'-P-CCNC: 19 U/L (ref 12–78)
BILIRUB SERPL-MCNC: 0.9 MG/DL (ref 0.2–1)
BUN SERPL-MCNC: 41 MG/DL (ref 7–18)
CALCIUM SERPL-MCNC: 9.7 MG/DL (ref 8.8–10.2)
CHLORIDE SERPL-SCNC: 101 MEQ/L (ref 98–107)
CO2 SERPL-SCNC: 26 MEQ/L (ref 21–32)
CREAT SERPL-MCNC: 2.2 MG/DL (ref 0.7–1.3)
CREAT UR-MCNC: 15.5 MG/DL
GFR SERPL CREATININE-BSD FRML MDRD: 30.9 ML/MIN/{1.73_M2} (ref 42–?)
GLUCOSE SERPL-MCNC: 119 MG/DL (ref 70–100)
HCT VFR BLD AUTO: 51.5 % (ref 42–52)
HGB BLD-MCNC: 16.6 G/DL (ref 13.5–17.5)
MAGNESIUM SERPL-MCNC: 2.4 MG/DL (ref 1.8–2.4)
MCH RBC QN AUTO: 31.2 PG (ref 27–33)
MCHC RBC AUTO-ENTMCNC: 32.2 G/DL (ref 32–36.5)
MCV RBC AUTO: 96.8 FL (ref 80–96)
OSMOLALITY UR: 345 MOSM/KG (ref 50–1400)
PLATELET # BLD AUTO: 141 10^3/UL (ref 150–450)
POTASSIUM 24H UR-SCNC: 23 MEQ/L
POTASSIUM SERPL-SCNC: 4.3 MEQ/L (ref 3.5–5.1)
PROT SERPL-MCNC: 7.5 GM/DL (ref 6.4–8.2)
PROT UR-MCNC: 13 MG/DL (ref 0–12)
RBC # BLD AUTO: 5.32 10^6/UL (ref 4.3–6.1)
SODIUM SERPL-SCNC: 136 MEQ/L (ref 136–145)
SODIUM UR-SCNC: 113 MEQ/L
WBC # BLD AUTO: 7.6 10^3/UL (ref 4–10)

## 2021-06-18 RX ADMIN — INSULIN LISPRO SCH UNITS: 100 INJECTION, SOLUTION INTRAVENOUS; SUBCUTANEOUS at 06:07

## 2021-06-18 RX ADMIN — ASPIRIN SCH MG: 81 TABLET ORAL at 10:01

## 2021-06-18 RX ADMIN — GABAPENTIN SCH MG: 300 CAPSULE ORAL at 20:38

## 2021-06-18 RX ADMIN — INSULIN LISPRO SCH UNITS: 100 INJECTION, SOLUTION INTRAVENOUS; SUBCUTANEOUS at 18:09

## 2021-06-18 RX ADMIN — PROBIOTIC PRODUCT - TAB SCH EA: TAB at 10:01

## 2021-06-18 RX ADMIN — LEVOTHYROXINE SODIUM SCH MCG: 112 TABLET ORAL at 06:09

## 2021-06-18 RX ADMIN — FUROSEMIDE SCH MG: 10 INJECTION, SOLUTION INTRAMUSCULAR; INTRAVENOUS at 04:57

## 2021-06-18 RX ADMIN — WARFARIN SODIUM SCH MG: 2.5 TABLET ORAL at 17:23

## 2021-06-18 RX ADMIN — INSULIN LISPRO SCH UNITS: 100 INJECTION, SOLUTION INTRAVENOUS; SUBCUTANEOUS at 13:41

## 2021-06-18 RX ADMIN — DIGOXIN SCH MG: 125 TABLET ORAL at 10:02

## 2021-06-18 NOTE — REPVR
PROCEDURE INFORMATION: 

Exam: CT Angiography Neck With Contrast 

Exam date and time: 6/18/2021 12:50 PM 

Age: 80 years old 

Clinical indication: Abnormal findings; Abnormal carotid US; Patient HX: 

Further evaluate carotid stenosis 



TECHNIQUE: 

Imaging protocol: Computed tomography angiography of the neck with contrast. 

3D rendering (Not supervised by radiologist): MIP and/or 3D reconstructed 

images were created by the technologist. 

Radiation optimization: All CT scans at this facility use at least one of these 

dose optimization techniques: automated exposure control; mA and/or kV 

adjustment per patient size (includes targeted exams where dose is matched to 

clinical indication); or iterative reconstruction. 

Contrast material: ISOVUE 370; Contrast volume: 60 ml; Contrast route: 

INTRAVENOUS (IV);  



COMPARISON: 

US Duplex,carotid (complete) 6/17/2021 9:13 PM 



FINDINGS: 

Right common carotid artery: No stenosis. No dissection or occlusion. 

Right internal carotid artery: Surgical clips are noted lateral to the right 

carotid bifurcation. There is no stenosis of the right internal carotid artery. 

Right external carotid artery: No occlusion or stenosis of the origin.  



Left common carotid artery: There is mild stenosis of the left common carotid 

artery origin. Atherosclerotic calcification is also noted in the distal left 

common carotid artery. There is severe stenosis of the distal left common 

carotid artery near the carotid bulb. 

Left internal carotid artery: Marked atherosclerotic calcification is present 

at the left carotid bifurcation and within the proximal left internal carotid 

artery. The dense calcification limits evaluation of the lumen, but there is 

likely near occlusion (greater than 90% stenosis). The mid/distal left internal 

carotid artery is patent. The distal left ICA is tortuous. 

Left external carotid artery: There is occlusion or near occlusion of the left 

external carotid artery origin. Flow is present within the left external 

carotid artery branches just beyond the origin. 



Right vertebral artery: Dense atherosclerotic calcification is noted at the 

right vertebral artery origin. This obscures the vertebral artery origin, but 

there is likely near complete occlusion. The right vertebral artery beyond the 

origin is patent and demonstrates antegrade flow on the recent ultrasound. 

Moderate/severe stenosis of the proximal right vertebral artery is also noted 

at the C6-C7 level. Additionally, there appears to be critical focal stenosis 

of the distal right vertebral artery at the C1 level just prior to entering the 

dura. Severe stenosis of the distal right V4 segment is also present. 

Left vertebral artery: The left vertebral artery is patent throughout the neck. 

There is severe stenosis of the distal left V4 segment. 

Basilar artery: Incidentally, the basilar artery is severely hypoplastic and/or 

stenotic. 



Left subclavian artery: There is severe stenosis of the left subclavian artery 

origin. 

Aorta: Atherosclerotic calcifications are noted within the aortic arch and its 

branches. 



Paranasal sinuses: Maxillary and ethmoid sinusitis is noted. 

Soft tissues: Normal. No significant soft tissue swelling. 



Bones/joints: No acute fracture. 



Lungs: Paraseptal emphysema is present in the upper lobes. 



IMPRESSION: 

1. Near occlusion (greater than 90% stenosis) of the proximal left ICA 

2. Occlusion or near occlusion of the left external carotid artery origin 

3. Near occlusion of the proximal right vertebral artery. Severe stenosis of 

the distal right vertebral artery is also present. 

4. Severe narrowing of the intracranial distal left vertebral artery (V4 

segment) 

5. 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 



REFERENCES: 

NASCET CRITERIA. The degree of internal carotid artery stenosis is based on 

NASCET criteria. Normal is no stenosis. Mild is less than 50% stenosis. 

Moderate is 50-69% stenosis. Severe is 70% to 99% stenosis. Total occlusion is 

no detectable patent lumen. 



Electronically signed by: Jordan Del Toro On 06/18/2021  13:32:20 PM

## 2021-06-18 NOTE — DS.PDOC
Discharge Summary


General


Date of Admission


Jun 17, 2021 at 20:27


Date of Discharge


6/20/21





Discharge Summary


PROCEDURES PERFORMED DURING STAY: [None].





ADMITTING DIAGNOSES: 


1. Syncope 


2. 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 


3. TAYE on CKD 





DISCHARGE DIAGNOSES:


1. 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 

artery, subclavian artery. 


2. 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 appear euvolemic at discharge. 


3. TAYE on CKD 





COMPLICATIONS/CHIEF COMPLAINT: Taye,Hyperkalemia,Syncope,Systolic&Diastolic Chf.





HISTORY OF PRESENT ILLNESS/HOSPITAL COURSE: Mr. Dubon presents because of 

episodes of lightheadedness and presyncope and occasionally syncope over the 

past week associated with vision changes especially affecting the left eye field

 where he sees white spots. His wife tells me this has happened once before 

about 2 weeks ago but they did not seek medical attention for it. Along with 

this during these episodes he takes basis shortness of breath which tends to 

resolve after the episode. In the emergency department ED physician interrogated

 his ICD/pacer and noted no events. Patient was found to have mild acute kidney 

injury on top CKD and admitting physician felt it was due to fluid overload and 

he was diuresed with IV Lasix. At the time of my exam the patient did not appear

 fluid overloaded clinically and IV diuresis was stopped. Patient has a known 

history of carotid artery stenosis and underwent a right-sided endarterectomy 

several years ago at [facility]. Ultrasound done of his neck reveals 

severe stenosis as outlined below. I discussed the results with Dr. Rausch at [facility] was in the same group as Priyanka Mcclellan his vascular surgeon and was

 on call today. Unfortunately we do not have vascular surgery available at her 

hospital currently. Dr. Rausch recommended CT neck with contrast to further 

evaluate the stenosis. Due to patient's TAYE I discussed the test with the 

radiologist and we proceeded with a reduced amount of contrast as well as IV 

fluid bolus prior to and after the procedure to minimize the effects of contrast

 on the kidney. Once results returned as outlined below I relayed the results to

 Dr. Rausch who kindly accepted the patient for transfer to [facility] 

for further evaluation by vascular surgery. It is possible that patient's 

symptoms can be explained by his severe carotid artery stenosis findings as 

outlined below and sooner intervention than originally planned may be necessary.

 I relayed the plan to the patient and his wife and they're both in agreement. 

As soon as a bed becomes available possibly this evening her tonight we will 

proceed with transfer. He had to wait 2 days until 6/20 for a bed to become av

lable. His kidney function has improved. He discharged transferred on 6/20/21





DISCHARGE MEDICATIONS: Please see below.


 


ALLERGIES: Please see below.





PHYSICAL EXAMINATION ON DISCHARGE:


VITAL SIGNS: Please see below.


At time of discharge patient denies further episodes of presyncope or syncope or

 vision changes. His neurological exam is intact. His heart rate is irregularly 

irregular with known history of atrial fibrillation. His lungs are clear to 

auscultation bilaterally without crackles. He has no lower extremity edema. He 

is saturating 95% on room air. He does not appear short of breath and is able to

 hold a full conversation without becoming short of breath.





LABORATORY DATA: Please see below.





IMAGING: 





CXR: No acute infiltrate.  Small round density in the right costophrenic angle 

probably represents a nipple shadow.  This could be confirmed with additional 

radiographs with nipple markers.





CT Head Without Contrast IMPRESSION:  No acute intracranial hemorrhage or edema.

  Sinusitis.





US Retroperitoneal Limited, Kidneys IMPRESSION: No evidence of hydronephrosis.  





US Duplex Bilateral Extracranial Arteries: FINDINGS: 


 Right side: 


 Patient is status post right endarterectomy. There is mild atherosclerotic 


plaque formation right carotid bifurcation. The right ICA/ CCA ratio is 1.4 


with peak velocity of the right internal carotid artery of on written 16 cm/s. 


The amount of narrowing on the right would be less than 50%. Right vertebral 


artery antegrade. 


 Left side: 


 There is very severe irregular atherosclerotic plaque formation throughout all 


of the left carotid bifurcation. This involves the CCA, ECA and ICA. Because of 


the severe calcified atherosclerotic plaque the exam is limited and I would 


recommend correlation with an MR angiogram or CT angiogram. The left ICA/ CCA 


ratio is 1.2. The peak velocity of the left internal carotid artery is only 74 


cm/s. The left external carotid artery demonstrates significant stenosis with a 


velocity of 192 cm/s. Velocity reading of the left internal carotid artery at 


74 cm/s may be inaccurate and therefore an MRA or CT angiogram should be 


considered. The left vertebral artery is antegrade. 





IMPRESSION: 


1. No significant stenosis right internal carotid artery. 


2. Very severe irregular plaque with calcification throughout the left carotid 


bifurcation. The velocity reading through the left internal carotid artery is 


diminished at 74 cm/s. Recommend MRA or CT angiography to better measure the 


amount of stenosis. 





CT Angiography Neck With Contrast 


FINDINGS: 


Right common carotid artery: No stenosis. No dissection or occlusion. 


Right internal carotid artery: Surgical clips are noted lateral to the right 


carotid bifurcation. There is no stenosis of the right internal carotid artery. 


Right external carotid artery: No occlusion or stenosis of the origin.  





Left common carotid artery: There is mild stenosis of the left common carotid 


artery origin. Atherosclerotic calcification is also noted in the distal left 


common carotid artery. There is severe stenosis of the distal left common 


carotid artery near the carotid bulb. 


Left internal carotid artery: Marked atherosclerotic calcification is present 


at the left carotid bifurcation and within the proximal left internal carotid 


artery. The dense calcification limits evaluation of the lumen, but there is 


likely near occlusion (greater than 90% stenosis). The mid/distal left internal 


carotid artery is patent. The distal left ICA is tortuous. 


Left external carotid artery: There is occlusion or near occlusion of the left 


external carotid artery origin. Flow is present within the left external 


carotid artery branches just beyond the origin. 





Right vertebral artery: Dense atherosclerotic calcification is noted at the 


right vertebral artery origin. This obscures the vertebral artery origin, but 


there is likely near complete occlusion. The right vertebral artery beyond the 


origin is patent and demonstrates antegrade flow on the recent ultrasound. 


Moderate/severe stenosis of the proximal right vertebral artery is also noted 


at the C6-C7 level. Additionally, there appears to be critical focal stenosis 


of the distal right vertebral artery at the C1 level just prior to entering the 


dura. Severe stenosis of the distal right V4 segment is also present. 


Left vertebral artery: The left vertebral artery is patent throughout the neck. 


There is severe stenosis of the distal left V4 segment. 


Basilar artery: Incidentally, the basilar artery is severely hypoplastic and/or 


stenotic. 





Left subclavian artery: There is severe stenosis of the left subclavian artery 


origin. 


Aorta: Atherosclerotic calcifications are noted within the aortic arch and its 


branches. 





Paranasal sinuses: Maxillary and ethmoid sinusitis is noted. 


Soft tissues: Normal. No significant soft tissue swelling. 


Bones/joints: No acute fracture. 


Lungs: Paraseptal emphysema is present in the upper lobes. 





IMPRESSION: 


1. Near occlusion (greater than 90% stenosis) of the proximal left ICA 


2. Occlusion or near occlusion of the left external carotid artery origin 


3. Near occlusion of the proximal right vertebral artery. Severe stenosis of 


the distal right vertebral artery is also present. 


4. Severe narrowing of the intracranial distal left vertebral artery (V4 


segment) 


5. 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 








PROGNOSIS: Fair





ACTIVITY: [As tolerated].





DIET: Nothing by mouth for possible procedure





DISPOSITION: Transferred to [facility] for evaluation by vascular 

surgery





DISCHARGE INSTRUCTIONS:


Transfer to [facility]





DISCHARGE CONDITION: [Stable].





TIME SPENT ON DISCHARGE: 45 minutes.





Vital Signs/I&Os





Vital Signs








  Date Time  Temp Pulse Resp B/P (MAP) Pulse Ox O2 Delivery O2 Flow Rate FiO2


 


6/18/21 14:00 97.7 62 18 120/56 (77) 90 Room Air  


 


6/18/21 05:03       1.0 














I&O- Last 24 Hours up to 6 AM 


 


 6/18/21





 06:00


 


Intake Total 410 ml


 


Output Total 1525 ml


 


Balance -1115 ml











Laboratory Data


Labs 24H


Laboratory Tests 2


6/17/21 18:17: 


Coronavirus (COVID-19)(PCR) NEGATIVE, Influenza Type A (RT-PCR) NEGATIVE, Influ

juan r Type B (RT-PCR) NEGATIVE, Respiratory Syncytial Virus (PCR) NEGATIVE


6/17/21 21:07: Bedside Glucose (Misc Panel) 169H


6/17/21 21:09: 


Estimated Mean Plasma Glucose 154H, Hemoglobin A1c 7.0, Osmolality 302H, Uric 

Acid 7.4H, Magnesium Level 2.4, Troponin I 0.10#, Thyroid Stimulating Hormone 

(TSH) 1.590, Digoxin Level 1.1


6/17/21 23:34: Bedside Glucose (Misc Panel) 116H


6/17/21 23:56: 


Urine Osmolality 345, Urine Random Creatinine 15.5, Urine Random Total Protein 

13.0H, Urine Random Sodium 113, Urine Random Potassium 23.0


6/18/21 03:00: Troponin I 0.08


6/18/21 05:43: Bedside Glucose (Misc Panel) 140H


6/18/21 05:59: 


Nucleated Red Blood Cells % (auto) 0.0, Anion Gap 9, Glomerular Filtration Rate 

30.9L, Calcium Level 9.7, Magnesium Level 2.4, Total Bilirubin 0.9, Aspartate 

Amino Transf (AST/SGOT) 10, Alanine Aminotransferase (ALT/SGPT) 19, Alkaline 

Phosphatase 92, Total Protein 7.5, Albumin 3.7, Albumin/Globulin Ratio 1.0


6/18/21 12:08: Bedside Glucose (Misc Panel) 177H


CBC/BMP


Laboratory Tests


6/17/21 21:09








6/18/21 05:59








FSBS





Laboratory Tests








Test


 6/17/21


21:07 6/17/21


23:34 6/18/21


05:43 6/18/21


12:08 Range/Units


 


 


Bedside Glucose (Misc Panel) 169 116 140 177   MG/DL











Discharge Medications


Scheduled


Allopurinol (Allopurinol) 100 Mg Tablet, 100 MG PO QPM, (Reported)


Aspirin (Aspirin EC) 81 Mg Tabec, 81 MG PO DAILY, (Reported)


Carvedilol (Carvedilol) 12.5 Mg Tab, 12.5 MG PO BID, (Reported)


Digoxin (Digoxin) 125 Mcg Tab, 125 MCG PO DAILY, (Reported)


Empagliflozin (Jardiance) 10 Mg Tablet, 10 MG PO DAILY, (Reported)


Furosemide (Furosemide) 20 Mg Tab, 20 MG PO DAILY, (Reported)


Gabapentin (Gabapentin) 300 Mg Cap, 600 MG PO QHS, (Reported)


Lactobacillus Acidophilus (Probiotic) 1 Each Capsule, 1 CAP PO DAILY, (Reported)


Levothyroxine Sodium (Levothyroxine Sodium) 112 Mcg Tablet, 112 MCG PO QAM, 

(Reported)


Polyvinyl Alcohol/Povidone/Pf (Refresh Classic Eye Drops) 1 Ea Sol, 1 DROP OU 

BID, (Reported)


Sacubitril/Valsartan (Entresto 24 mg-26 mg Tablet) 1 Tab Tab, 1 TAB PO BID, 

(Reported)


Warfarin Sodium (Jantoven) 5 Mg Tab, 2.5 MG PO QPM, (Reported)





Allergies


Coded Allergies:  


     Penicillins (Verified  Allergy, Unknown, 11/25/19)


   HIVES


     Sulfa (Sulfonamide Antibiotics) (Verified  Allergy, Unknown, 11/25/19)


   HIVES


     Statins-Hmg-Coa Reductase Inhibitor (Verified  Adverse Reaction, Unknown, 

MUSCLE WEAKNESS, 6/17/21)











LUCAS JOSE MD              Jun 18, 2021 15:40

## 2021-06-19 VITALS — DIASTOLIC BLOOD PRESSURE: 53 MMHG | SYSTOLIC BLOOD PRESSURE: 106 MMHG

## 2021-06-19 VITALS — SYSTOLIC BLOOD PRESSURE: 114 MMHG | DIASTOLIC BLOOD PRESSURE: 64 MMHG

## 2021-06-19 VITALS — DIASTOLIC BLOOD PRESSURE: 59 MMHG | SYSTOLIC BLOOD PRESSURE: 102 MMHG | OXYGEN SATURATION: 92 %

## 2021-06-19 VITALS — OXYGEN SATURATION: 92 %

## 2021-06-19 LAB
BUN SERPL-MCNC: 42 MG/DL (ref 7–18)
CALCIUM SERPL-MCNC: 8.6 MG/DL (ref 8.8–10.2)
CHLORIDE SERPL-SCNC: 104 MEQ/L (ref 98–107)
CO2 SERPL-SCNC: 27 MEQ/L (ref 21–32)
CREAT SERPL-MCNC: 1.98 MG/DL (ref 0.7–1.3)
GFR SERPL CREATININE-BSD FRML MDRD: 34.9 ML/MIN/{1.73_M2} (ref 42–?)
GLUCOSE SERPL-MCNC: 102 MG/DL (ref 70–100)
HCT VFR BLD AUTO: 48.5 % (ref 42–52)
HGB BLD-MCNC: 15.7 G/DL (ref 13.5–17.5)
MAGNESIUM SERPL-MCNC: 2.5 MG/DL (ref 1.8–2.4)
MCH RBC QN AUTO: 31.4 PG (ref 27–33)
MCHC RBC AUTO-ENTMCNC: 32.4 G/DL (ref 32–36.5)
MCV RBC AUTO: 97 FL (ref 80–96)
PLATELET # BLD AUTO: 150 10^3/UL (ref 150–450)
POTASSIUM SERPL-SCNC: 3.9 MEQ/L (ref 3.5–5.1)
RBC # BLD AUTO: 5 10^6/UL (ref 4.3–6.1)
SODIUM SERPL-SCNC: 138 MEQ/L (ref 136–145)
WBC # BLD AUTO: 7.6 10^3/UL (ref 4–10)

## 2021-06-19 RX ADMIN — INSULIN LISPRO SCH UNITS: 100 INJECTION, SOLUTION INTRAVENOUS; SUBCUTANEOUS at 18:15

## 2021-06-19 RX ADMIN — ASPIRIN SCH MG: 81 TABLET ORAL at 08:13

## 2021-06-19 RX ADMIN — LEVOTHYROXINE SODIUM SCH MCG: 112 TABLET ORAL at 05:49

## 2021-06-19 RX ADMIN — INSULIN LISPRO SCH UNITS: 100 INJECTION, SOLUTION INTRAVENOUS; SUBCUTANEOUS at 12:19

## 2021-06-19 RX ADMIN — INSULIN LISPRO SCH UNITS: 100 INJECTION, SOLUTION INTRAVENOUS; SUBCUTANEOUS at 00:21

## 2021-06-19 RX ADMIN — PROBIOTIC PRODUCT - TAB SCH EA: TAB at 08:13

## 2021-06-19 RX ADMIN — FUROSEMIDE SCH MG: 20 TABLET ORAL at 08:13

## 2021-06-19 RX ADMIN — DIGOXIN SCH MG: 125 TABLET ORAL at 08:15

## 2021-06-19 RX ADMIN — GABAPENTIN SCH MG: 300 CAPSULE ORAL at 20:56

## 2021-06-19 RX ADMIN — INSULIN LISPRO SCH UNITS: 100 INJECTION, SOLUTION INTRAVENOUS; SUBCUTANEOUS at 05:50

## 2021-06-19 NOTE — IPNPDOC
Text Note


Date of Service


The patient was seen on 6/19/21.





NOTE


Subjective:


Mr. Dubon was discharged last evening however were still waiting on a bed at 

[facility] for evaluation by vessel surgery. I saw Mr. Dubon is 

morning is doing well fingers but there is no acute overnight events. He feels 

well denies any shortness of breath or chest pain. Denies any more dizziness or 

syncopal episodes.





Objective:


Constitutional: Awake and alert, in no apparent distress


ENT: Sclera are clear. Mucosa is moist. 


Respiratory:  Lungs CTA bilaterally.  No respiratory distress. 


Cardiovascular: Irregularly irregular


Gastrointestinal: Abdomen is soft, non distended, non tender, BS present. 


Musculoskeletal: No lower extremity edema


Neurologic: No focal neurological deficit.  


Mental Status: A&O x3, normal affect 





Assessment/plan:





Mr. Dubon presents because of episodes of lightheadedness and presyncope and 

occasionally syncope over the past week associated with vision changes 

especially affecting the left eye field where he sees white spots. His wife 

tells me this has happened once before about 2 weeks ago but they did not seek 

medical attention for it. Along with this during these episodes he takes basis 

shortness of breath which tends to resolve after the episode. In the emergency 

department ED physician interrogated his ICD/pacer and noted no events. Patient 

was found to have mild acute kidney injury on top CKD and admitting physician 

felt it was due to fluid overload and he was diuresed with IV Lasix. At the time

of my exam the patient did not appear fluid overloaded clinically and IV 

diuresis was stopped. Patient has a known history of carotid artery stenosis and

underwent a right-sided endarterectomy several years ago at [facility].

Ultrasound done of his neck reveals severe stenosis as outlined below. I 

discussed the results with Dr. Rausch at [facility] was in the same group

as Priyanka Mcclellan his vascular surgeon and was on call today. Unfortunately we do 

not have vascular surgery available at her hospital currently. Dr. Rausch 

recommended CT neck with contrast to further evaluate the stenosis. Due to 

patient's CLAUDIA I discussed the test with the radiologist and we proceeded with a 

reduced amount of contrast as well as IV fluid bolus prior to and after the 

procedure to minimize the effects of contrast on the kidney. Once results 

returned as outlined below I relayed the results to Dr. Rausch who kindly accepted

the patient for transfer to [facility] for further evaluation by 

vascular surgery. It is possible that patient's symptoms can be explained by his

severe carotid artery stenosis findings as outlined below and sooner 

intervention than originally planned may be necessary. I relayed the plan to the

patient and his wife and they're both in agreement. As soon as a bed becomes 

available possibly this evening her tonight we will proceed with transfer.





Continue present management.


Awaiting for bed for transfer


Kidney function has improved this morning





A Sunil 


Hospitalist





[facility], [facility]


[facility]


Laboratory Tests


6/19/21 05:41








6/19/21 06:52











Vital Signs








  Date Time  Temp Pulse Resp B/P (MAP) Pulse Ox O2 Delivery O2 Flow Rate FiO2


 


6/19/21 08:15  61      


 


6/19/21 08:14    126/77    


 


6/19/21 06:00 97.9  17  95 Nasal Cannula 2.0 














I&O- Last 24 Hours up to 6 AM 


 


 6/19/21





 06:00


 


Intake Total 1740 ml


 


Output Total 1725 ml


 


Balance 15 ml

















LUCAS JOSE MD              Jun 19, 2021 11:07

## 2021-06-20 VITALS — SYSTOLIC BLOOD PRESSURE: 118 MMHG | DIASTOLIC BLOOD PRESSURE: 68 MMHG

## 2021-06-20 VITALS — DIASTOLIC BLOOD PRESSURE: 61 MMHG | SYSTOLIC BLOOD PRESSURE: 118 MMHG

## 2021-06-20 VITALS — OXYGEN SATURATION: 92 %

## 2021-06-20 RX ADMIN — INSULIN LISPRO SCH UNITS: 100 INJECTION, SOLUTION INTRAVENOUS; SUBCUTANEOUS at 05:43

## 2021-06-20 RX ADMIN — ASPIRIN SCH MG: 81 TABLET ORAL at 09:06

## 2021-06-20 RX ADMIN — INSULIN LISPRO SCH UNITS: 100 INJECTION, SOLUTION INTRAVENOUS; SUBCUTANEOUS at 00:36

## 2021-06-20 RX ADMIN — LEVOTHYROXINE SODIUM SCH MCG: 112 TABLET ORAL at 05:46

## 2021-06-20 RX ADMIN — FUROSEMIDE SCH MG: 20 TABLET ORAL at 09:07

## 2021-06-20 RX ADMIN — PROBIOTIC PRODUCT - TAB SCH EA: TAB at 09:06

## 2021-06-20 RX ADMIN — DIGOXIN SCH MG: 125 TABLET ORAL at 09:06

## 2021-06-20 NOTE — IPNPDOC
Text Note


Date of Service


The patient was seen on 6/20/21.





NOTE


Subjective:


Son and examined Mr. Dubon today and bedside he is doing well no complaints 

he has a bed available today to go to [facility]. No acute overnight events.





Objective:


Constitutional: Awake and alert, in no apparent distress


ENT: Sclera are clear. Mucosa is moist. 


Respiratory:  Lungs CTA bilaterally.  No respiratory distress. 


Cardiovascular: Irregularly irregular


Gastrointestinal: Abdomen is soft, non distended, non tender, BS present. 


Musculoskeletal: No lower extremity edema


Neurologic: No focal neurological deficit.  


Mental Status: A&O x3, normal affect 





Assessment/plan:





Mr. Dubon presents because of episodes of lightheadedness and presyncope and 

occasionally syncope over the past week associated with vision changes 

especially affecting the left eye field where he sees white spots. His wife 

tells me this has happened once before about 2 weeks ago but they did not seek 

medical attention for it. Along with this during these episodes he takes basis 

shortness of breath which tends to resolve after the episode. In the emergency 

department ED physician interrogated his ICD/pacer and noted no events. Patient 

was found to have mild acute kidney injury on top CKD and admitting physician 

felt it was due to fluid overload and he was diuresed with IV Lasix. At the time

of my exam the patient did not appear fluid overloaded clinically and IV 

diuresis was stopped. Patient has a known history of carotid artery stenosis and

underwent a right-sided endarterectomy several years ago at [facility].

Ultrasound done of his neck reveals severe stenosis as outlined below. I 

discussed the results with Dr. Rausch at [facility] was in the same group

as Priyanka Mcclellan his vascular surgeon and was on call today. Unfortunately we do 

not have vascular surgery available at her hospital currently. Dr. Rausch 

recommended CT neck with contrast to further evaluate the stenosis. Due to 

patient's CLAUDIA I discussed the test with the radiologist and we proceeded with a 

reduced amount of contrast as well as IV fluid bolus prior to and after the 

procedure to minimize the effects of contrast on the kidney. Once results 

returned as outlined below I relayed the results to Dr. Rausch who kindly accepted

the patient for transfer to [facility] for further evaluation by 

vascular surgery. It is possible that patient's symptoms can be explained by his

severe carotid artery stenosis findings as outlined below and sooner 

intervention than originally planned may be necessary. I relayed the plan to the

patient and his wife and they're both in agreement. 





A bed has become available today and patient will be transferred today to his 

ankles.


Continue present management.


Kidney function has improved 





A Sunil 


Hospitalist





[facility]


[facility]





Vital Signs








  Date Time  Temp Pulse Resp B/P (MAP) Pulse Ox O2 Delivery O2 Flow Rate FiO2


 


6/20/21 09:07    118/61    


 


6/20/21 09:06  80      


 


6/20/21 09:00       1.0 


 


6/20/21 09:00     92 Nasal Cannula  


 


6/20/21 06:00 97.3  18     














I&O- Last 24 Hours up to 6 AM 


 


 6/20/21





 06:00


 


Intake Total 1030 ml


 


Output Total 1300 ml


 


Balance -270 ml

















LUCAS JOSE MD              Jun 20, 2021 11:31

## 2021-06-21 NOTE — ECHO
ECHOCARDIOGRAM



DATE OF PROCEDURE: 06/18/2021



Age:  80

Gender:  Male

Height:   

Weight:   





REFERRING PHYSICIAN:  Yanna Garsia M.D.



PATIENT LOCATION:  Room 4230



REASON FOR TESTING:  Shortness of breath. 



MEASUREMENTS:

2D IMAGING:

     IVS 1.3 cm

     LV 4.9 cm

     LVPW 1.0 cm

     LA 5.0 cm

     Aorta 3.6 cm

     IVC 2.0 cm



DOPPLER MEASUREMENTS:

     Peak velocity across the aortic valve 1.0 m/sec

     Peak velocity across the LVOT 0.46 m/sec

     Mitral E 0.70

     Maximum tricuspid valve velocity 3.3 m/sec



2D COMMENTS: 

1.  Normal left ventricular size and wall thickness, but with a moderately

severe left ventricular systolic dysfunction.  Left ventricular systolic

ejection fraction is estimated at 30-35%.

2.  Mildly to moderately enlarged left atrium.  The right atrium appeared to be

mildly enlarged.  The right ventricle appeared to be normal in size in limited

views.

3.  The atrial septum appeared to be normal without evidence of defect or shunt.

4.  Normal aortic root.

5.  No pericardial effusion seen.

6.  Mildly calcified aortic valve with normal leaflet excursion.  Mildly

calcified mitral annulus with normal anterior mitral valve leaflet motion. 

Normal tricuspid valve and pulmonic valve.  The proximal pulmonary artery

branches were not well-visualized.

7.  The inferior vena cava is dilated, central venous pressure might be

elevated.

8.  Pacemaker/automatic implantable cardioverter-defibrillator (AICD) wire

defects noted in the right heart chambers.



DOPPLER:

It detects moderate tricuspid regurgitation.  The calculated pulmonary artery

systolic pressure varies between 50-60 mmHg.  Assessment of the left

ventricular diastolic function was limited.



IMPRESSION:

1.  Moderately severe global left ventricular systolic dysfunction.  Assessment

of the left ventricular diastolic function was limited.

2.  Aortic valve sclerosis without stenosis or aortic root dilation.

3.  Age related mitral annulus calcification.  The left atrium is mildly to

moderately enlarged, but no evidence of significant mitral regurgitation or

mitral stenosis detected.

4.  Moderate tricuspid regurgitation with moderately severe pulmonary

hypertension and dilated right atrium.

5.  There are some features of elevated central venous pressure, the inferior

vena cava was mildly enlarged.

6.  Pacemaker/AICD wire defects noted in the right heart chambers.

## 2021-07-01 ENCOUNTER — HOSPITAL ENCOUNTER (EMERGENCY)
Dept: HOSPITAL 53 - M ED | Age: 81
Discharge: HOME | End: 2021-07-01
Payer: MEDICARE

## 2021-07-01 VITALS — SYSTOLIC BLOOD PRESSURE: 107 MMHG | DIASTOLIC BLOOD PRESSURE: 61 MMHG

## 2021-07-01 DIAGNOSIS — N18.9: ICD-10-CM

## 2021-07-01 DIAGNOSIS — Z95.1: ICD-10-CM

## 2021-07-01 DIAGNOSIS — F41.0: ICD-10-CM

## 2021-07-01 DIAGNOSIS — Z88.2: ICD-10-CM

## 2021-07-01 DIAGNOSIS — L76.22: Primary | ICD-10-CM

## 2021-07-01 DIAGNOSIS — I25.10: ICD-10-CM

## 2021-07-01 DIAGNOSIS — Z95.5: ICD-10-CM

## 2021-07-01 DIAGNOSIS — I25.2: ICD-10-CM

## 2021-07-01 DIAGNOSIS — Z88.8: ICD-10-CM

## 2021-07-01 DIAGNOSIS — Z79.01: ICD-10-CM

## 2021-07-01 DIAGNOSIS — Z88.0: ICD-10-CM

## 2021-07-01 DIAGNOSIS — Z79.899: ICD-10-CM

## 2021-07-01 DIAGNOSIS — Z79.82: ICD-10-CM

## 2021-07-01 LAB
APTT BLD: 50 SECONDS (ref 24.2–38.5)
BASOPHILS # BLD AUTO: 0 10^3/UL (ref 0–0.2)
BASOPHILS NFR BLD AUTO: 0.5 % (ref 0–1)
BUN SERPL-MCNC: 29 MG/DL (ref 7–18)
CALCIUM SERPL-MCNC: 8.2 MG/DL (ref 8.8–10.2)
CHLORIDE SERPL-SCNC: 105 MEQ/L (ref 98–107)
CO2 SERPL-SCNC: 24 MEQ/L (ref 21–32)
CREAT SERPL-MCNC: 1.67 MG/DL (ref 0.7–1.3)
DIGOXIN SERPL-MCNC: 0.7 NG/ML (ref 0.5–2)
EOSINOPHIL # BLD AUTO: 0.3 10^3/UL (ref 0–0.5)
EOSINOPHIL NFR BLD AUTO: 5.3 % (ref 0–3)
GFR SERPL CREATININE-BSD FRML MDRD: 42.5 ML/MIN/{1.73_M2} (ref 42–?)
GLUCOSE SERPL-MCNC: 175 MG/DL (ref 70–100)
HCT VFR BLD AUTO: 46.7 % (ref 42–52)
HGB BLD-MCNC: 15.1 G/DL (ref 13.5–17.5)
INR PPP: 1.99
LYMPHOCYTES # BLD AUTO: 1.1 10^3/UL (ref 1.5–5)
LYMPHOCYTES NFR BLD AUTO: 17.8 % (ref 24–44)
MCH RBC QN AUTO: 31.2 PG (ref 27–33)
MCHC RBC AUTO-ENTMCNC: 32.3 G/DL (ref 32–36.5)
MCV RBC AUTO: 96.5 FL (ref 80–96)
MONOCYTES # BLD AUTO: 0.5 10^3/UL (ref 0–0.8)
MONOCYTES NFR BLD AUTO: 8.5 % (ref 2–8)
NEUTROPHILS # BLD AUTO: 4.2 10^3/UL (ref 1.5–8.5)
NEUTROPHILS NFR BLD AUTO: 67.3 % (ref 36–66)
PLATELET # BLD AUTO: 156 10^3/UL (ref 150–450)
POTASSIUM SERPL-SCNC: 4.5 MEQ/L (ref 3.5–5.1)
PROTHROMBIN TIME: 23 SECONDS (ref 12.5–14.3)
RBC # BLD AUTO: 4.84 10^6/UL (ref 4.3–6.1)
SODIUM SERPL-SCNC: 135 MEQ/L (ref 136–145)
WBC # BLD AUTO: 6.2 10^3/UL (ref 4–10)

## 2021-07-01 PROCEDURE — 86850 RBC ANTIBODY SCREEN: CPT

## 2021-07-01 PROCEDURE — 85610 PROTHROMBIN TIME: CPT

## 2021-07-01 PROCEDURE — 96360 HYDRATION IV INFUSION INIT: CPT

## 2021-07-01 PROCEDURE — 80048 BASIC METABOLIC PNL TOTAL CA: CPT

## 2021-07-01 PROCEDURE — 70498 CT ANGIOGRAPHY NECK: CPT

## 2021-07-01 PROCEDURE — 86900 BLOOD TYPING SEROLOGIC ABO: CPT

## 2021-07-01 PROCEDURE — 99284 EMERGENCY DEPT VISIT MOD MDM: CPT

## 2021-07-01 PROCEDURE — 80162 ASSAY OF DIGOXIN TOTAL: CPT

## 2021-07-01 PROCEDURE — 86901 BLOOD TYPING SEROLOGIC RH(D): CPT

## 2021-07-01 PROCEDURE — 87798 DETECT AGENT NOS DNA AMP: CPT

## 2021-07-01 PROCEDURE — 85025 COMPLETE CBC W/AUTO DIFF WBC: CPT

## 2021-07-01 PROCEDURE — 85730 THROMBOPLASTIN TIME PARTIAL: CPT

## 2021-07-01 NOTE — REPVR
PROCEDURE INFORMATION: 

Exam: CT Angiography Neck With Contrast 

Exam date and time: 7/1/2021 12:57 AM 

Age: 80 years old 

Clinical indication: Other: Bleeding; Prior surgery; Surgery date: 3-7 days 

post-operative; Surgery type: Left endarterectomy; Additional info: Left 

carotid endarterectomy, actively bleeding 



TECHNIQUE: 

Imaging protocol: Computed tomography angiography of the neck with contrast. 

3D rendering (Not supervised by radiologist): MIP and/or 3D reconstructed 

images were created by the technologist. 

Radiation optimization: All CT scans at this facility use at least one of these 

dose optimization techniques: automated exposure control; mA and/or kV 

adjustment per patient size (includes targeted exams where dose is matched to 

clinical indication); or iterative reconstruction. 

Contrast material: ISO; Contrast volume: 75 ml; Contrast route: INTRAVENOUS 

(IV);  



COMPARISON: 

1. CT ANGIO NECK 2021-06-18 12:54 

2. US Duplex,carotid (complete) 2021-06-17 21:13 



FINDINGS: 

Tubes, catheters and devices: ICD leads. 



Right common carotid artery: Mild right common carotid artery atherosclerosis. 

Right internal carotid artery: Mild right ICA intimal thickening and 

irregularity, without stenosis-post endarterectomy. 

Right external carotid artery: No occlusion or stenosis of the origin.  



Left common carotid artery: Focal mild narrowing of the distal left common 

carotid artery, probably related to clamp. Mild left common carotid artery 

atherosclerosis. 

Left internal carotid artery: Left carotid endarterectomy. Mild, largely 

expected, irregularity of the wall. Mild left mid and distal ICA 

atherosclerosis. Tortuous patent distal left cervical ICA. 

Left external carotid artery: Moderate atherosclerotic disease in the left 

external carotid artery. Enlarged distal left ECA branches, likely compensatory 

from the chronic previous stenosis. 



Right vertebral artery: No stenosis. No dissection or occlusion. 

Left vertebral artery: No stenosis. No dissection or occlusion. 



Left subclavian artery: Patent left subclavian artery stent. 



Paranasal sinuses: Paranasal sinus disease. 

Soft tissues: Left neck hematoma measuring 3 x 4 x 7 cm at the site of surgery, 

lateral to the left carotid, with scattered gas foci tracks along the left 

sternocleidomastoid muscle, postoperative, superinfection not excluded. 



Bones/joints: No acute fracture. 



Lungs: Paraseptal apical lung emphysema. 



IMPRESSION: 

1. Post left carotid endarterectomy with adjacent hematoma. No contrast 

extravasation or pseudoaneurysm. 

2. Left carotid endarterectomy. Mild, largely expected, irregularity of the 

wall. 

3. Mild right ICA intimal thickening and irregularity, without stenosis-post 

endarterectomy. 

4. Left neck hematoma measuring 3 x 4 x 7 cm at the site of surgery, lateral to 

the left carotid, with scattered gas foci tracks along the left 

sternocleidomastoid muscle, postoperative, superinfection not excluded. 



REFERENCES: 

NASCET CRITERIA. The degree of internal carotid artery stenosis is based on 

NASCET criteria. Normal is no stenosis. Mild is less than 50% stenosis. 

Moderate is 50-69% stenosis. Severe is 70% to 99% stenosis. Total occlusion is 

no detectable patent lumen. 



Electronically signed by: Mendel Hemphill On 07/01/2021  02:00:57 AM

## 2021-07-02 ENCOUNTER — HOSPITAL ENCOUNTER (OUTPATIENT)
Dept: HOSPITAL 53 - M LAB REF | Age: 81
End: 2021-07-02
Attending: INTERNAL MEDICINE
Payer: MEDICARE

## 2021-07-02 DIAGNOSIS — I65.23: ICD-10-CM

## 2021-07-02 DIAGNOSIS — I48.20: Primary | ICD-10-CM

## 2021-07-02 LAB
INR PPP: 1.99
PROTHROMBIN TIME: 23 SECONDS (ref 12.5–14.3)

## 2021-08-17 ENCOUNTER — HOSPITAL ENCOUNTER (EMERGENCY)
Dept: HOSPITAL 53 - M ED | Age: 81
Discharge: HOME | End: 2021-08-17
Payer: MEDICARE

## 2021-08-17 VITALS — WEIGHT: 160.34 LBS | HEIGHT: 71 IN | BODY MASS INDEX: 22.45 KG/M2

## 2021-08-17 VITALS — SYSTOLIC BLOOD PRESSURE: 147 MMHG | DIASTOLIC BLOOD PRESSURE: 78 MMHG

## 2021-08-17 DIAGNOSIS — R04.0: Primary | ICD-10-CM

## 2021-08-17 DIAGNOSIS — N18.9: ICD-10-CM

## 2021-08-17 DIAGNOSIS — Z88.2: ICD-10-CM

## 2021-08-17 DIAGNOSIS — Z95.1: ICD-10-CM

## 2021-08-17 DIAGNOSIS — I25.10: ICD-10-CM

## 2021-08-17 DIAGNOSIS — Z79.82: ICD-10-CM

## 2021-08-17 DIAGNOSIS — I25.2: ICD-10-CM

## 2021-08-17 DIAGNOSIS — Z95.5: ICD-10-CM

## 2021-08-17 DIAGNOSIS — Z88.0: ICD-10-CM

## 2021-08-17 DIAGNOSIS — Z79.899: ICD-10-CM

## 2021-08-17 LAB
HCT VFR BLD AUTO: 47 % (ref 42–52)
HGB BLD-MCNC: 14.6 G/DL (ref 13.5–17.5)
MCH RBC QN AUTO: 28.9 PG (ref 27–33)
MCHC RBC AUTO-ENTMCNC: 31.1 G/DL (ref 32–36.5)
MCV RBC AUTO: 93.1 FL (ref 80–96)
PLATELET # BLD AUTO: 177 10^3/UL (ref 150–450)
RBC # BLD AUTO: 5.05 10^6/UL (ref 4.3–6.1)
WBC # BLD AUTO: 8.7 10^3/UL (ref 4–10)

## 2021-09-01 ENCOUNTER — HOSPITAL ENCOUNTER (OUTPATIENT)
Dept: HOSPITAL 53 - M LAB | Age: 81
End: 2021-09-01
Attending: STUDENT IN AN ORGANIZED HEALTH CARE EDUCATION/TRAINING PROGRAM
Payer: MEDICARE

## 2021-09-01 DIAGNOSIS — I95.2: Primary | ICD-10-CM

## 2021-09-01 LAB
BASE EXCESS BLDA CALC-SCNC: -0.6 MMOL/L (ref -2–2)
BDY SITE: (no result)
CO2 BLDA CALC-SCNC: 22.6 MEQ/L (ref 23–31)
HCO3 BLDA-SCNC: 21.7 MEQ/L (ref 22–26)
HCO3 STD BLDA-SCNC: 23.8 MEQ/L (ref 22–26)
PCO2 BLDA: 29.8 MMHG (ref 35–45)
PH BLDA: 7.48 UNITS (ref 7.35–7.45)
PO2 BLDA: 56.7 MMHG (ref 75–100)
SAO2 % BLDA: 90.6 % (ref 95–99)

## 2021-10-18 ENCOUNTER — HOSPITAL ENCOUNTER (OUTPATIENT)
Dept: HOSPITAL 53 - M LAB REF | Age: 81
End: 2021-10-18
Attending: STUDENT IN AN ORGANIZED HEALTH CARE EDUCATION/TRAINING PROGRAM
Payer: MEDICARE

## 2021-10-18 DIAGNOSIS — I48.20: Primary | ICD-10-CM

## 2021-12-01 ENCOUNTER — HOSPITAL ENCOUNTER (OUTPATIENT)
Dept: HOSPITAL 53 - M PLAIMG | Age: 81
End: 2021-12-01
Attending: INTERNAL MEDICINE
Payer: MEDICARE

## 2021-12-01 DIAGNOSIS — I70.0: ICD-10-CM

## 2021-12-01 DIAGNOSIS — R06.02: ICD-10-CM

## 2021-12-01 DIAGNOSIS — R91.8: Primary | ICD-10-CM

## 2021-12-01 DIAGNOSIS — J90: ICD-10-CM
